# Patient Record
Sex: FEMALE | Race: WHITE
[De-identification: names, ages, dates, MRNs, and addresses within clinical notes are randomized per-mention and may not be internally consistent; named-entity substitution may affect disease eponyms.]

---

## 2019-07-18 ENCOUNTER — HOSPITAL ENCOUNTER (OUTPATIENT)
Dept: HOSPITAL 74 - JASU-ENDO | Age: 74
Discharge: HOME | End: 2019-07-18
Payer: COMMERCIAL

## 2019-07-18 VITALS — HEART RATE: 77 BPM | TEMPERATURE: 98.2 F | SYSTOLIC BLOOD PRESSURE: 142 MMHG | DIASTOLIC BLOOD PRESSURE: 63 MMHG

## 2019-07-18 DIAGNOSIS — D50.9: Primary | ICD-10-CM

## 2019-07-18 PROCEDURE — 3E033GC INTRODUCTION OF OTHER THERAPEUTIC SUBSTANCE INTO PERIPHERAL VEIN, PERCUTANEOUS APPROACH: ICD-10-PCS

## 2019-08-12 ENCOUNTER — HOSPITAL ENCOUNTER (OUTPATIENT)
Dept: HOSPITAL 74 - JINFUSION | Age: 74
Discharge: HOME | End: 2019-08-12
Payer: COMMERCIAL

## 2019-08-12 VITALS — SYSTOLIC BLOOD PRESSURE: 121 MMHG | HEART RATE: 76 BPM | DIASTOLIC BLOOD PRESSURE: 64 MMHG

## 2019-08-12 VITALS — TEMPERATURE: 98.9 F

## 2019-08-12 DIAGNOSIS — D50.9: Primary | ICD-10-CM

## 2019-08-12 PROCEDURE — 3E033GC INTRODUCTION OF OTHER THERAPEUTIC SUBSTANCE INTO PERIPHERAL VEIN, PERCUTANEOUS APPROACH: ICD-10-PCS

## 2020-01-24 ENCOUNTER — HOSPITAL ENCOUNTER (INPATIENT)
Dept: HOSPITAL 74 - JER | Age: 75
LOS: 4 days | Discharge: HOME | DRG: 871 | End: 2020-01-28
Payer: COMMERCIAL

## 2020-01-24 VITALS — BODY MASS INDEX: 58.9 KG/M2

## 2020-01-24 DIAGNOSIS — E11.42: ICD-10-CM

## 2020-01-24 DIAGNOSIS — E03.9: ICD-10-CM

## 2020-01-24 DIAGNOSIS — R09.02: ICD-10-CM

## 2020-01-24 DIAGNOSIS — Z87.11: ICD-10-CM

## 2020-01-24 DIAGNOSIS — M48.00: ICD-10-CM

## 2020-01-24 DIAGNOSIS — D50.9: ICD-10-CM

## 2020-01-24 DIAGNOSIS — E87.2: ICD-10-CM

## 2020-01-24 DIAGNOSIS — Z79.899: ICD-10-CM

## 2020-01-24 DIAGNOSIS — M21.962: ICD-10-CM

## 2020-01-24 DIAGNOSIS — K21.9: ICD-10-CM

## 2020-01-24 DIAGNOSIS — J18.9: ICD-10-CM

## 2020-01-24 DIAGNOSIS — L40.50: ICD-10-CM

## 2020-01-24 DIAGNOSIS — N17.9: ICD-10-CM

## 2020-01-24 DIAGNOSIS — A41.89: Primary | ICD-10-CM

## 2020-01-24 DIAGNOSIS — E83.42: ICD-10-CM

## 2020-01-24 DIAGNOSIS — I71.4: ICD-10-CM

## 2020-01-24 DIAGNOSIS — J44.9: ICD-10-CM

## 2020-01-24 DIAGNOSIS — E78.5: ICD-10-CM

## 2020-01-24 DIAGNOSIS — R26.81: ICD-10-CM

## 2020-01-24 DIAGNOSIS — E87.6: ICD-10-CM

## 2020-01-24 DIAGNOSIS — I10: ICD-10-CM

## 2020-01-24 LAB
ALBUMIN SERPL-MCNC: 4 G/DL (ref 3.4–5)
ALP SERPL-CCNC: 51 U/L (ref 45–117)
ALT SERPL-CCNC: 32 U/L (ref 13–61)
ANION GAP SERPL CALC-SCNC: 13 MMOL/L (ref 8–16)
APTT BLD: 29.7 SECONDS (ref 25.2–36.5)
AST SERPL-CCNC: 33 U/L (ref 15–37)
BASOPHILS # BLD: 0.4 % (ref 0–2)
BILIRUB SERPL-MCNC: 0.4 MG/DL (ref 0.2–1)
BUN SERPL-MCNC: 16.2 MG/DL (ref 7–18)
CALCIUM SERPL-MCNC: 9.5 MG/DL (ref 8.5–10.1)
CHLORIDE SERPL-SCNC: 96 MMOL/L (ref 98–107)
CO2 SERPL-SCNC: 27 MMOL/L (ref 21–32)
CREAT SERPL-MCNC: 1.4 MG/DL (ref 0.55–1.3)
DEPRECATED RDW RBC AUTO: 20.4 % (ref 11.6–15.6)
EOSINOPHIL # BLD: 0.2 % (ref 0–4.5)
GLUCOSE SERPL-MCNC: 120 MG/DL (ref 74–106)
HCT VFR BLD CALC: 38 % (ref 32.4–45.2)
HGB BLD-MCNC: 12.3 GM/DL (ref 10.7–15.3)
INR BLD: 1.07 (ref 0.83–1.09)
LYMPHOCYTES # BLD: 4.6 % (ref 8–40)
MCH RBC QN AUTO: 27.9 PG (ref 25.7–33.7)
MCHC RBC AUTO-ENTMCNC: 32.3 G/DL (ref 32–36)
MCV RBC: 86.3 FL (ref 80–96)
MONOCYTES # BLD AUTO: 11.8 % (ref 3.8–10.2)
NEUTROPHILS # BLD: 83 % (ref 42.8–82.8)
PLATELET # BLD AUTO: 222 K/MM3 (ref 134–434)
PLATELET BLD QL SMEAR: ADEQUATE
PMV BLD: 9.6 FL (ref 7.5–11.1)
POTASSIUM SERPLBLD-SCNC: 3.5 MMOL/L (ref 3.5–5.1)
PROT SERPL-MCNC: 7.8 G/DL (ref 6.4–8.2)
PT PNL PPP: 12.6 SEC (ref 9.7–13)
RBC # BLD AUTO: 4.4 M/MM3 (ref 3.6–5.2)
SODIUM SERPL-SCNC: 135 MMOL/L (ref 136–145)
WBC # BLD AUTO: 9.2 K/MM3 (ref 4–10)

## 2020-01-24 RX ADMIN — HYDRALAZINE HYDROCHLORIDE SCH MG: 25 TABLET, FILM COATED ORAL at 21:48

## 2020-01-24 RX ADMIN — DOCUSATE SODIUM SCH MG: 100 CAPSULE, LIQUID FILLED ORAL at 21:49

## 2020-01-24 RX ADMIN — INSULIN ASPART SCH: 100 INJECTION, SOLUTION INTRAVENOUS; SUBCUTANEOUS at 21:56

## 2020-01-24 RX ADMIN — ATORVASTATIN CALCIUM SCH MG: 20 TABLET, FILM COATED ORAL at 21:49

## 2020-01-24 NOTE — HP
Admitting History and Physical





- Admission


Chief Complaint: 74 y.O F presented today with fever, generalized weakness, 

nausea, vomiting, difficulty tolerationg PO food/ fluids, cough, SOB, hypoxemia 

on on  RA and was sent to ER [facility]


History of Present Illness: 


DM type 2 on Jentadueto


HLD


Hypothyroidism


Iron deficiency Anemia, GI bleed, 


HTN on Cardizem, Hydralazine, Losartan HCT


Ascending AA repair at [facility]


Psoriatic arthritis on Methotrexate


Pancreatitis


Spinal stenosis, s/p ostheoporotic vertebral fracture- on Prolia.


Asthma/COPD


Ankle surgeries.


Umbilical hernia repair.





History Source: Patient, Medical Record


Limitations to Obtaining History: No Limitations





- Past Medical History


CNS: Yes: Peripheral Neuropathy, Vertigo.  No: Alzheimer's, CVA, Dementia, 

Multiple Sclerosis, Parkinson's, Seizure


Cardiovascular: Yes: Aneurysm, HTN, Hyperlipdemia, Murmur.  No: CHF


Pulmonary: Yes: Asthma.  No: O2 Dependent, Sleep Apnea


Gastrointestinal: Yes: GI Bleed, Pancreatitis, Peptic Ulcer Disease.  No: 

Ascites, Constipation, Crohn's Disease, Irritable Bowel Disease, Ulcerative 

Colitis


Heme/Onc: Yes: Anemia


Rheumatology: Yes: Other (Psoriatic arthritis)


Endocrine: Yes: Diabetes Mellitus, Hypothyroidism





- Past Surgical History


Past Surgical History: Yes: AAA Repair





- Smoking History


Smoking history: Never smoked


Aproximately how many cigarettes per day: 0





- Alcohol/Substance Use


Hx Alcohol Use: No





- Social History


Usual Living Arrangement: Yes: With Spouse





Home Medications





- Allergies


Allergies/Adverse Reactions: 


 Allergies











Allergy/AdvReac Type Severity Reaction Status Date / Time


 


oxycodone Allergy   Verified 01/24/20 12:11


 


tuna oil Allergy   Verified 01/24/20 12:11














- Home Medications


Home Medications: 


Ambulatory Orders





Aspirin Coated [Ecotrin -] 81 mg PO DAILY 05/29/12 


Atorvastatin Ca [Lipitor] 20 mg PO DAILY 05/29/12 


Diltiazem [Cardizem -] 420 mg PO DAILY 05/29/12 


Glipizide/Metformin HCl [Metaglip 2.5-250 mg Tablet] 2 tab PO DAILY 05/29/12 


Levothyroxine Sodium [Synthroid] 25 mcg PO DAILY 05/29/12 


Losartan 50Mg/Hctz 12.5MG [Hyzaar -] 50 mg PO DAILY 05/29/12 


Esomeprazole Mag Trihydrate [Nexium] 40 mg PO DAILY 02/10/14 


Folic Acid 1 mg PO DAILY 07/18/19 


Hydralazine HCl 25 mg PO DAILY 07/18/19 











Family Medical History


Family History: Unremarkable





Review of Systems





- Review of Systems


Constitutional: reports: Lethargy, Loss of Appetite, Malaise, Weakness


Eyes: reports: No Symptoms


HENT: reports: Throat Pain


Neck: denies: Decreased ROM, Tenderness


Cardiovascular: reports: Shortness of Breath.  denies: Chest Pain, Edema


Respiratory: reports: Cough, SOB, SOB on Exertion.  denies: Hemoptysis, Wheezing


Gastrointestinal: reports: Nausea, Vomiting.  denies: Abdominal Pain, Bloating


Genitourinary: denies: Burning, Discharge, Dysuria


Breasts: reports: No Symptoms Reported


Musculoskeletal: reports: Back Pain, Decreased ROM, Muscle Pain


Integumentary: reports: No Symptoms


Neurological: denies: Change in LOC, Change in Speech, Confusion, Seizure, 

Unsteady Gait, Weakness


Endocrine: reports: Excessive Sweating, Intolerance to Cold, Intolerance to Heat


Hematology/Lymphatic: denies: Easily Bruised, Excessive Bleeding, Swollen Glands


Psychiatric: reports: No Symptoms





Physical Examination


Vital Signs: 


 Vital Signs











Temperature  100.0 F H  01/24/20 15:29


 


Pulse Rate  71   01/24/20 15:29


 


Respiratory Rate  18   01/24/20 15:29


 


Blood Pressure  111/60   01/24/20 15:29


 


O2 Sat by Pulse Oximetry (%)  97   01/24/20 15:29











Constitutional: Yes: Anxious, Ashen, Diaphoresis, Moderate Distress


Eyes: Yes: Conjunctiva Clear, EOM Intact


HENT: Yes: Atraumatic, Normocephalic, Pharyngeal Erythema


Neck: Yes: Supple, Trachea Midline.  No: Lymphadenopathy, Rigid


Cardiovascular: Yes: Regular Rate and Rhythm, S1, S2.  No: JVD


Respiratory: Yes: Regular, CTA Bilaterally, On Nasal O2.  No: Accessory Muscle 

Use


Gastrointestinal: Yes: Normal Bowel Sounds, Soft, Hepatomegaly.  No: Abdomen, 

Obese, Ascites, Hypoactive Bowel Sounds, Splenomegaly, Tenderness, Tenderness, 

Epigastrium


...Rectal Exam: Yes: Deferred


Renal/: No: Anuria, Bladder Distention, CVA Tenderness - Left, CVA Tenderness 

- Right


Breast(s): Yes: WNL


Musculoskeletal: Yes: Back Pain


Extremities: Yes: Deformity.  No: Amputation, Calf Tenderness, Cold


Peripheral Pulses WNL: No


Integumentary: Yes: WNL


Neurological: Yes: Alert, Oriented, Tingling, Unsteady Gait, Weakness.  No: 

Aphasia, Facial Droop, Lethargy, Seizure, Unresponsive


...Motor Strength: WNL


Psychiatric: Yes: WNL


Labs: 


 CBC, BMP





 01/24/20 13:07 





 01/24/20 13:07 











Imaging





- Results


Chest X-ray: Report Reviewed





Problem List





- Problems


(1) Hypoxia


Assessment/Plan: 


O2 supplementation NC


Pulm consult


CT chest


Code(s): R09.02 - HYPOXEMIA   





(2) Pneumonia


Assessment/Plan: 


CT chest


ID consult


Iv ceftriaxone.


Code(s): J18.9 - PNEUMONIA, UNSPECIFIED ORGANISM   


Qualifiers: 


   Pneumonia type: due to unspecified organism   Laterality: unspecified 

laterality   Lung location: unspecified part of lung   Qualified Code(s): J18.9 

- Pneumonia, unspecified organism   





(3) Sepsis


Assessment/Plan: 


Lactate 3.0


repeat :actate afte 30 ml/kg fluids.


Bld cx-P


Code(s): A41.9 - SEPSIS, UNSPECIFIED ORGANISM   


Qualifiers: 


   Sepsis type: sepsis due to unspecified organism   Sepsis acute organ 

dysfunction status: without acute organ dysfunction   Qualified Code(s): A41.9 

- Sepsis, unspecified organism   





(4) Arthritis


Assessment/Plan: 


Hold Metotrexate for now.


Code(s): M19.90 - UNSPECIFIED OSTEOARTHRITIS, UNSPECIFIED SITE   





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


Assessment/Plan: 


BGM


Sliding scale


Levemir 10 units Qd





Code(s): E13.9 - OTHER SPECIFIED DIABETES MELLITUS WITHOUT COMPLICATIONS   





(6) HTN (hypertension)


Assessment/Plan: 


Cardizem, hydralazine, ARB PO.


Code(s): I10 - ESSENTIAL (PRIMARY) HYPERTENSION   


Qualifiers: 


   Hypertension type: essential hypertension   Qualified Code(s): I10 - 

Essential (primary) hypertension

## 2020-01-24 NOTE — CONS
DATE OF CONSULTATION:  01/24/2020

 

INFECTIOUS DISEASE CONSULTATION

 

REQUESTED BY:  Corona Dillon MD

 

This is a 74-year-old female with a history of hypertension, non-insulin-dependent

diabetes mellitus and psoriatic arthritis.  She presents to the ER today with

complaints of dizziness for the last two or three days.  She has been feeling like

she has had a upper respiratory tract infection with a stuffed nose, minimal cough. 

This morning, she started having nausea and dizziness but she denies any chills at

home.  She does not recall having any fever.  She has not vomited.  She has no

diarrhea.  There is no history of any travel.  She has not had any sick contacts. 

She denies abdominal pain or chest pain.  She lives with her , who is well. 

She saw her PCP for these complaints, who sent her to the ER.  She had fever of 101

in the ER on arrival with a heart rate of 95.  Blood pressure was 102/46.  Her O2

saturation on admission was 84% with improvement in her fever.  Her current

temperature was 100 with an O2 saturation of 97%.  She received IV hydration in the

ER with improvement.  She has now consumed a whole bottle of water and is feeling

somewhat improved.  

 

PAST MEDICAL HISTORY:  Notable for type 2 diabetes, hyperlipidemia, hypothyroidism,

anemia, GI bleed, hypertension, psoriatic arthritis, pancreatitis, asthma, COPD.

 

PAST SURGICAL HISTORY:  Status post ascending aorta repair at [facility] in October 2018.  The surgery went very well.  There were no complications.  She has had surgery

on her left foot and has had an umbilical hernia repair.  

 

CURRENT MEDICATIONS:  Ecotrin, atorvastatin, Cardizem, Metaglip, Synthroid, Hyzaar,

Nexium, folic acid and hydralazine.  She is also on weekly methotrexate and takes

Humira every two weeks.  

 

SOCIAL HISTORY:  She is originally from [place].  There is no history of cigarette or

substance use.  She lives with her spouse.  

 

FAMILY HISTORY:  Unremarkable. 

 

REVIEW OF SYSTEMS:  She denies pharyngitis.  She has no headache.  She reports

minimal cough and denies any hemoptysis.  She is not wheezing.  She notes nausea but

on vomiting.  There is no abdominal pain or diarrhea.  She has no dysuria or flank

pain.  She has no skin rash or breaks in her skin.    

 

PHYSICAL EXAMINATION:  

VITALS:  T-max is 101.8, current temperature is 100, pulse is 71, blood pressure

111/60, respiratory rate 18, oxygen saturation 97% on 2 liters.

HEENT:  Normocephalic.  Eyes are anicteric.  She has no conjunctival hemorrhages. 

Her mouth is without any pharyngitis.  

NECK:  Supple.  There are no meningeal signs.  

LUNGS:  Clear to auscultation.  

HEART:  Regular rate and rhythm.  

ABDOMEN:  Soft, nontender.  No CVA or spinal tenderness. 

EXTREMITIES:  No edema.  Her left foot has a deformity.  

SKIN:  There is no skin breakdown.  She has no rash.  

 

LABS:  White count is 9.2, hemoglobin 12.3, platelets of 222,000 with 83%

lymphocytes.  Her INR is 1.  BUN and creatinine are 16 and 1.4.  Lactic acid is 3. 

LFTs are normal.  Influenza screen is negative.  Blood cultures have been sent and

are pending.  

 

To my reading, her chest x-ray is negative. 

 

IN SUMMARY:  This is an elderly woman, immunocompromised by virtue of her psoriatic

arthritis, on methotrexate and Humira.  She presents with what appears to be a viral

syndrome with URI symptoms and now with dizziness and fever of unclear etiology.  She

has been given fluids.  She has been given ceftriaxone and Zithromax.  A CAT scan of

her chest has been ordered.  She is now resting quite comfortably.  We will give her

one dose of vancomycin as well, as the source of her sepsis is unclear.  She needs a

urinalysis and urine culture.  Would also obtain a Legionella urinary antigen and

follow up on the CAT scan. 

 

 

 

JAKOB CHARLES M.D.

 

[id number]

DD: 01/24/2020 18:22

DT: 01/24/2020 19:55

Job #:  [id number]

## 2020-01-24 NOTE — EKG
Test Reason : 

Blood Pressure : ***/*** mmHG

Vent. Rate : 078 BPM     Atrial Rate : 078 BPM

   P-R Int : 168 ms          QRS Dur : 076 ms

    QT Int : 392 ms       P-R-T Axes : 034 -26 092 degrees

   QTc Int : 446 ms

 

*** POOR DATA QUALITY, INTERPRETATION MAY BE ADVERSELY AFFECTED

NORMAL SINUS RHYTHM

POSSIBLE LEFT ATRIAL ENLARGEMENT

NONSPECIFIC ST ABNORMALITY

ABNORMAL ECG

 

Confirmed by RIGO ORTIZ MD ([id number]) on 1/24/2020 1:59:45 PM

 

Referred By:             Confirmed By:RIGO ORTIZ MD

## 2020-01-24 NOTE — PN
Progress Note (short form)





- Note


Progress Note: 





ID consult dictated





75 yo female admitted from home with dizziness and nausea this am


she has had uri symptoms with runny nose and mild cough for last 2-3 days


no diarrhea, no dysuria


no chills


takes humira and methotrexate for her psoriatic arthritis


history of aneurysm repair October 2018





found to be febrile in ED, also hypoxia


lactic acid 3- no better after ivf





going for chest ct


got rocephin/ziithromax in ED





sepsis


r/o pneumonia


immunocompromised host by meds





blood cultures


urinary antigens


chest ct


UA and urine culture





vancomycin 


rocephin/zithromax to continue











Problem List





- Problems


(1) Sepsis


Code(s): A41.9 - SEPSIS, UNSPECIFIED ORGANISM   


Qualifiers: 


   Sepsis type: sepsis due to unspecified organism   Sepsis acute organ 

dysfunction status: without acute organ dysfunction   Qualified Code(s): A41.9 

- Sepsis, unspecified organism   





(2) Pneumonia


Code(s): J18.9 - PNEUMONIA, UNSPECIFIED ORGANISM   


Qualifiers: 


   Pneumonia type: due to unspecified organism   Laterality: unspecified 

laterality   Lung location: unspecified part of lung   Qualified Code(s): J18.9 

- Pneumonia, unspecified organism   





(3) Immunocompromised state due to drug therapy


Code(s): Z79.899 - OTHER LONG TERM (CURRENT) DRUG THERAPY

## 2020-01-24 NOTE — PDOC
Documentation entered by Bekah Sims SCRIBE, acting as scribe for Connor Vernon MD.








Connor Vernon MD:  This documentation has been prepared by the Levi pickard Adrianna, SCRIBE, under my direction and personally reviewed by me in its entirety.  I 

confirm that the documentation accurately reflects all work, treatment, 

procedures, and medical decision making performed by me.  





Attending Attestation





- Resident


Resident Name: Debi Arechiga





- ED Attending Attestation


I have performed the following: I have examined & evaluated the patient, The 

case was reviewed & discussed with the resident, I agree w/resident's findings 

& plan, Exceptions are as noted





- HPI


HPI: 


The patient is a 74 year old female, with a significant PMH of hypertension, 

hyperlipidemia, non-insulin-dependent diabetes, hypothyroid thyroidism, GERD, 

arthritis and left foot deformity, who presents to the ED for evaluation of 

cough for 3 days, and nausea and unsteady gait today. Patient endorses a dry 

cough for the past 3 days. Today, she developed nausea, and has been avoiding 

eating or drinking anything in fear of vomiting. She additionally reports 

feeling dizzy and unsteady when ambulating. Patient saw her PCP for these 

complaints, who advised her to come to the ED for further evaluation. 





Allergies: Oxycodone, tuna oil


Surgical History:Left foot and bilateral knee orthopedic surgery 


Social History: Denies EtOH, tobacco, or illicit drug use


PCP: Dr. Dillon 





- Physicial Exam


PE: 


GENERAL: The patient is awake, alert, and fully oriented, Nontoxic - in no 

acute distress.


HEAD: Normocephalic, atraumatic.


EYES: extraocular movements intact, sclera anicteric, conjunctiva clear.


ENT: Normal voice, Moist mucous membranes.


NECK: Normal range of motion, supple


LUNGS: Breath sounds equal, clear to auscultation bilaterally. No wheezes, no 

rhonchi, no rales.


HEART: Regular rate and rhythm, without murmur, rub or gallop.


ABDOMEN: Soft, nontender, No guarding, no rebound.No CVA tenderness


EXTREMITIES: Normal range of motion, no edema. No cyanosis. No erythema, or 

tenderness.


NEUROLOGICAL: No facial asymmetry, Normal speech,


PSYCH: Normal mood, normal affect.


SKIN: Warm, Dry, normal turgor.








- Critical Care Time


Total Critical Care Time: 60


Critical Care Statement: The care of this patient involved high complexity 

decision making to prevent further life threatening deterioration of the patient

's condition and/or to evaluate & treat vital organ system(s) failure or risk 

of failure.





- Medical Decision Making





01/24/20 14:13


74 F with cough and nausea. Found to be febrile and hypoxic. Will evaluate for 

sepsis. Possible PNA.


- Labs, cultures


- CXR, UA


- IVF, tylenol, abx

## 2020-01-24 NOTE — PDOC
History of Present Illness





- General


Chief Complaint: SIRS, Suspected/Possible


Stated Complaint: VOMITING/ WEAKNESS


Time Seen by Provider: 01/24/20 12:27





- History of Present Illness


Initial Comments: 


Radha Cast is a 75yo woman with a PMH of HTN, HLD, NIDDM, hypythyroidism, 

GERD, OA who presents with 3 days of cough, now with nausea and unsteadiness 

while walking today. She states that she has had the dry cough for the past 

several days but has otherwise been feeling in her normal state of health. Today

, she felt extremely nauseated and did not eat or drink anything today, fearing 

that she would start vomiting if she did. She went to her PMD this morning and 

reports she was told that she "probably has a virus" but was sent to the ED for 

additional workup. 





Ms Cast additionally reports that she has felt unsteady walking and 

slightly dizzy today. She denies any sensation of vertigo, and she does not 

believe that the dizziness worsens with change in position. She endorses a long 

history of rt ear tinnitus but denies any recent changes in this condition. She 

additionally denies any recent congestion, rhinorrhea, sore throat, fevers/

chills or other recent symptoms. She does report that she feels dehydrated 

today. 











Past History





- Past Medical History


Allergies/Adverse Reactions: 


 Allergies











Allergy/AdvReac Type Severity Reaction Status Date / Time


 


oxycodone Allergy   Verified 01/24/20 12:11


 


tuna oil Allergy   Verified 01/24/20 12:11











Home Medications: 


Ambulatory Orders





Aspirin Coated [Ecotrin -] 81 mg PO DAILY 05/29/12 


Atorvastatin Ca [Lipitor] 20 mg PO DAILY 05/29/12 


Diltiazem [Cardizem -] 420 mg PO DAILY 05/29/12 


Glipizide/Metformin HCl [Metaglip 2.5-250 mg Tablet] 2 tab PO DAILY 05/29/12 


Levothyroxine Sodium [Synthroid] 25 mcg PO DAILY 05/29/12 


Losartan 50Mg/Hctz 12.5MG [Hyzaar -] 50 mg PO DAILY 05/29/12 


Esomeprazole Mag Trihydrate [Nexium] 40 mg PO DAILY 02/10/14 


Folic Acid 1 mg PO DAILY 07/18/19 


Hydralazine HCl 25 mg PO DAILY 07/18/19 








COPD: No


Diabetes: Yes


HTN: Yes


Hypercholesterolemia: Yes


Thyroid Disease: Yes





- Surgical History


Orthopedic Surgery: Yes (L FOOT/BILATERAL KNEE)





- Psycho Social/Smoking Cessation Hx


Smoking Status: No


Smoking History: Never smoked


Number of Cigarettes Smoked Daily: 0


Hx Alcohol Use: No


Drug/Substance Use Hx: No


Substance Use Type: None





**Review of Systems





- Review of Systems


Comments:: 


General: + fevers, no chills, no weight or appetite change, + malaise


HEENT: No changes in vision, no changes in hearing, no congestion, no sore 

throat


CV: No chest pain, no palpitations, no LE edema


Pulm: No SOB, + cough, no wheezing


GI: + nausea, no vomiting, no change in bowel habits, no melena


: No frequency, no urgency, no dysuria


Musc: No back pain, no joint swelling, no recent injury


Skin: No rash, no lesions, no erythema


Endo: No excessive thirst, no heat/cold intolerance


Heme: No unusual bruising or bleeding, no swollen glands


Neuro: No syncope, no numbness/tingling, no focal weakness


Vasc: No claudication


Psych: No recent change in mood, no SI or HI











*Physical Exam





- Vital Signs


 Last Vital Signs











Temp Pulse Resp BP Pulse Ox


 


 101.3 F H  95 H  20   102/46 L  84 L


 


 01/24/20 12:14  01/24/20 12:14  01/24/20 12:14  01/24/20 12:14  01/24/20 12:14














- Physical Exam


General: Comfortable, no acute distress


HEENT: Atraumatic, PERRL, EOMI, MMM, voice normal, normal neck ROM 


Cards: RRR, no murmur appreciated


Pulm: Comfortable on room air, clear to auscultation bilaterally


Abd: Soft, nontender, nondistended


Ext: Atraumatic. No LE edema. ROM intact. WWP


Skin: Normal color, no rashes or lesions


Neuro: A&Ox3, CN grossly intact, normal speech, motor/sensory grossly intact 

and symmetric


Psych: Mood appropriate to situation 











ED Treatment Course





- LABORATORY


CBC & Chemistry Diagram: 


 01/24/20 13:07





 01/24/20 13:07





Medical Decision Making





- Medical Decision Making





01/24/20 12:55


Radha Cast is a 75yo woman with a PMH of HTN, HLD, NIDDM, hypythyroidism, 

GERD, OA who presents with 3 days of cough, now with nausea and unsteadiness 

while walking today. She has not had any food or fluids today due to the 

nausea. She was seen earlier today by her PMD and sent to the ED for additional 

evaluation. 





- Hypoxic and febrile on presentation (sats now 89% on RA), dry mouth/lips also 

noted. Concerning for sepsis, dehydration. Pneumonia v influenza most likely, 

though could be viral flu-like illness


- Sepsis workup


- Influenza


- IVF, acetaminophen





01/24/20 13:52


- Bedside US completed by Dr Briones. B-lines on Rt lung US noted


- Labs and CXR pending





01/24/20 14:13


- EKG reviewed. NSR, HR 78, normal axis, normal intervals, nonspecific ST 

abnormality


- No leukocytosis. Remainder of labs pending


- CXR to be completed.





01/24/20 16:48


- CXR without infiltrate, but pt appears to have pneumonia clinically. Will 

admit for additional management given hypoxia. Now at 94% w/ 2L by NC


- Dr Dillon at bedside to admit Ms Cast








Discussed with Dr Janeen Arechiga


PGY2








Discharge





- Discharge Information


Problems reviewed: Yes


Clinical Impression/Diagnosis: 


 Hypoxia





Sepsis


Qualifiers:


 Sepsis type: sepsis due to unspecified organism Sepsis acute organ dysfunction 

status: without acute organ dysfunction Qualified Code(s): A41.9 - Sepsis, 

unspecified organism





Pneumonia


Qualifiers:


 Pneumonia type: due to unspecified organism Laterality: unspecified laterality 

Lung location: unspecified part of lung Qualified Code(s): J18.9 - Pneumonia, 

unspecified organism








- Admission


Yes





- Follow up/Referral





- Patient Discharge Instructions





- Post Discharge Activity

## 2020-01-25 LAB
ALBUMIN SERPL-MCNC: 3.6 G/DL (ref 3.4–5)
ALP SERPL-CCNC: 46 U/L (ref 45–117)
ALT SERPL-CCNC: 33 U/L (ref 13–61)
ANION GAP SERPL CALC-SCNC: 9 MMOL/L (ref 8–16)
ANISOCYTOSIS BLD QL: (no result)
AST SERPL-CCNC: 34 U/L (ref 15–37)
BASOPHILS # BLD: 0.3 % (ref 0–2)
BILIRUB SERPL-MCNC: 0.4 MG/DL (ref 0.2–1)
BUN SERPL-MCNC: 11 MG/DL (ref 7–18)
CALCIUM SERPL-MCNC: 8.7 MG/DL (ref 8.5–10.1)
CHLORIDE SERPL-SCNC: 98 MMOL/L (ref 98–107)
CO2 SERPL-SCNC: 31 MMOL/L (ref 21–32)
CREAT SERPL-MCNC: 0.9 MG/DL (ref 0.55–1.3)
DEPRECATED RDW RBC AUTO: 19.6 % (ref 11.6–15.6)
EOSINOPHIL # BLD: 0.3 % (ref 0–4.5)
GLUCOSE SERPL-MCNC: 183 MG/DL (ref 74–106)
HCT VFR BLD CALC: 36.6 % (ref 32.4–45.2)
HGB BLD-MCNC: 11.9 GM/DL (ref 10.7–15.3)
LYMPHOCYTES # BLD: 14.3 % (ref 8–40)
MACROCYTES BLD QL: (no result)
MAGNESIUM SERPL-MCNC: 1.7 MG/DL (ref 1.8–2.4)
MCH RBC QN AUTO: 27.7 PG (ref 25.7–33.7)
MCHC RBC AUTO-ENTMCNC: 32.4 G/DL (ref 32–36)
MCV RBC: 85.4 FL (ref 80–96)
MONOCYTES # BLD AUTO: 17 % (ref 3.8–10.2)
NEUTROPHILS # BLD: 68.1 % (ref 42.8–82.8)
OVALOCYTES BLD QL SMEAR: (no result)
PHOSPHATE SERPL-MCNC: 2.6 MG/DL (ref 2.5–4.9)
PLATELET # BLD AUTO: 199 K/MM3 (ref 134–434)
PLATELET BLD QL SMEAR: NORMAL
PMV BLD: 8.8 FL (ref 7.5–11.1)
POTASSIUM SERPLBLD-SCNC: 2.9 MMOL/L (ref 3.5–5.1)
PROT SERPL-MCNC: 6.8 G/DL (ref 6.4–8.2)
RBC # BLD AUTO: 4.29 M/MM3 (ref 3.6–5.2)
SODIUM SERPL-SCNC: 137 MMOL/L (ref 136–145)
WBC # BLD AUTO: 6.3 K/MM3 (ref 4–10)

## 2020-01-25 RX ADMIN — POTASSIUM CHLORIDE SCH MLS/HR: 7.46 INJECTION, SOLUTION INTRAVENOUS at 18:48

## 2020-01-25 RX ADMIN — DOCUSATE SODIUM SCH MG: 100 CAPSULE, LIQUID FILLED ORAL at 21:10

## 2020-01-25 RX ADMIN — SODIUM CHLORIDE, POTASSIUM CHLORIDE, SODIUM LACTATE AND CALCIUM CHLORIDE SCH: 600; 310; 30; 20 INJECTION, SOLUTION INTRAVENOUS at 17:45

## 2020-01-25 RX ADMIN — IPRATROPIUM BROMIDE AND ALBUTEROL SULFATE SCH: .5; 3 SOLUTION RESPIRATORY (INHALATION) at 20:00

## 2020-01-25 RX ADMIN — LOSARTAN POTASSIUM SCH MG: 50 TABLET, FILM COATED ORAL at 11:06

## 2020-01-25 RX ADMIN — AZITHROMYCIN DIHYDRATE SCH MLS/HR: 500 INJECTION, POWDER, LYOPHILIZED, FOR SOLUTION INTRAVENOUS at 16:04

## 2020-01-25 RX ADMIN — SODIUM CHLORIDE, POTASSIUM CHLORIDE, SODIUM LACTATE AND CALCIUM CHLORIDE SCH MLS/HR: 600; 310; 30; 20 INJECTION, SOLUTION INTRAVENOUS at 11:23

## 2020-01-25 RX ADMIN — CEFTRIAXONE SCH MLS/HR: 1 INJECTION, POWDER, FOR SOLUTION INTRAMUSCULAR; INTRAVENOUS at 11:07

## 2020-01-25 RX ADMIN — INSULIN ASPART SCH: 100 INJECTION, SOLUTION INTRAVENOUS; SUBCUTANEOUS at 12:51

## 2020-01-25 RX ADMIN — HYDRALAZINE HYDROCHLORIDE SCH MG: 25 TABLET, FILM COATED ORAL at 16:04

## 2020-01-25 RX ADMIN — HYDRALAZINE HYDROCHLORIDE SCH MG: 25 TABLET, FILM COATED ORAL at 06:06

## 2020-01-25 RX ADMIN — PANTOPRAZOLE SODIUM SCH MG: 40 TABLET, DELAYED RELEASE ORAL at 11:06

## 2020-01-25 RX ADMIN — ASPIRIN SCH MG: 81 TABLET, COATED ORAL at 11:06

## 2020-01-25 RX ADMIN — HYDRALAZINE HYDROCHLORIDE SCH MG: 25 TABLET, FILM COATED ORAL at 21:11

## 2020-01-25 RX ADMIN — MAGNESIUM OXIDE TAB 400 MG (241.3 MG ELEMENTAL MG) SCH MG: 400 (241.3 MG) TAB at 11:06

## 2020-01-25 RX ADMIN — POTASSIUM CHLORIDE SCH MLS/HR: 7.46 INJECTION, SOLUTION INTRAVENOUS at 12:50

## 2020-01-25 RX ADMIN — ATORVASTATIN CALCIUM SCH MG: 20 TABLET, FILM COATED ORAL at 21:11

## 2020-01-25 RX ADMIN — INSULIN ASPART SCH: 100 INJECTION, SOLUTION INTRAVENOUS; SUBCUTANEOUS at 17:35

## 2020-01-25 RX ADMIN — IPRATROPIUM BROMIDE AND ALBUTEROL SULFATE SCH AMP: .5; 3 SOLUTION RESPIRATORY (INHALATION) at 14:20

## 2020-01-25 RX ADMIN — INSULIN ASPART SCH: 100 INJECTION, SOLUTION INTRAVENOUS; SUBCUTANEOUS at 06:06

## 2020-01-25 RX ADMIN — INSULIN ASPART SCH: 100 INJECTION, SOLUTION INTRAVENOUS; SUBCUTANEOUS at 21:10

## 2020-01-25 RX ADMIN — FOLIC ACID SCH MG: 1 TABLET ORAL at 12:50

## 2020-01-25 RX ADMIN — POTASSIUM CHLORIDE SCH MLS/HR: 7.46 INJECTION, SOLUTION INTRAVENOUS at 21:11

## 2020-01-25 RX ADMIN — SODIUM CHLORIDE, POTASSIUM CHLORIDE, SODIUM LACTATE AND CALCIUM CHLORIDE SCH: 600; 310; 30; 20 INJECTION, SOLUTION INTRAVENOUS at 11:23

## 2020-01-25 NOTE — PN
Progress Note, Physician


Chief Complaint: 





sob


History of Present Illness: 





patient feeling ok today, a little better, less SOB, still coughing





- Current Medication List


Current Medications: 


Active Medications





Acetaminophen (Tylenol -)  500 mg PO Q6H PRN


   PRN Reason: FEVER


Aspirin (Ecotrin -)  81 mg PO DAILY [facility]


   Last Admin: 01/25/20 11:06 Dose:  81 mg


Atorvastatin Calcium (Lipitor -)  20 mg PO HS [facility]


   Last Admin: 01/24/20 21:49 Dose:  20 mg


Diltiazem HCl (Cardizem Cd -)  180 mg PO DAILY [facility]


Docusate Sodium (Colace -)  300 mg PO HS [facility]


   Last Admin: 01/24/20 21:49 Dose:  300 mg


Folic Acid (Folic Acid -)  1 mg PO DAILY [facility]


Hydralazine HCl (Apresoline -)  25 mg PO TID [facility]


   Last Admin: 01/25/20 06:06 Dose:  25 mg


Ceftriaxone Sodium 1 gm/ (Dextrose)  50 mls @ 200 mls/hr IVPB DAILY [facility]; 

Protocol


   Last Admin: 01/25/20 11:07 Dose:  200 mls/hr


Lactated Ringer's (Lactated Ringers Solution)  1,000 ml in 1,000 mls @ 83 mls/

hr IV ASDIR [facility]


   Last Admin: 01/25/20 11:23 Dose:  83 mls/hr


Azithromycin 500 mg/ Dextrose  250 mls @ 250 mls/hr IVPB Q24H [facility]


Potassium Chloride (Potassium Chloride 10 Meq Premix Ivpb -)  10 meq in 100 mls 

@ 100 mls/hr IVPB Q60M [facility]


   Stop: 01/25/20 14:44


Insulin Aspart (Novolog Vial Sliding Scale -)  1 vial SQ ACHS [facility]; Protocol


   Last Admin: 01/25/20 06:06 Dose:  Not Given


Levothyroxine Sodium (Synthroid -)  25 mcg PO DAILY@0700 [facility]


Losartan Potassium (Cozaar -)  50 mg PO DAILY [facility]


   Last Admin: 01/25/20 11:06 Dose:  50 mg


Magnesium Oxide (Mag-Ox -)  400 mg PO DAILY [facility]


   Last Admin: 01/25/20 11:06 Dose:  400 mg


Magnesium Sulfate (Magnesium Sulfate)  1 gm IVPB ONCE ONE


   Stop: 01/25/20 11:37


Metformin HCl (Glucophage Xr -)  750 mg PO DAILY@0700 [facility]


Pantoprazole Sodium (Protonix -)  40 mg PO DAILY [facility]


   Last Admin: 01/25/20 11:06 Dose:  40 mg


Potassium Chloride (K-Dur -)  40 meq PO ONCE ONE


   Stop: 01/25/20 11:37


Potassium Chloride (K-Dur -)  20 meq PO DAILY [facility]


Tiotropium Bromide (Spiriva Respimat)  2 puff IH DAILY [facility]


   Last Admin: 01/25/20 11:08 Dose:  2 puff











- Objective


Vital Signs: 


 Vital Signs











Temperature  99.2 F   01/25/20 09:00


 


Pulse Rate  82   01/25/20 09:00


 


Respiratory Rate  18   01/25/20 09:00


 


Blood Pressure  131/58 L  01/25/20 09:00


 


O2 Sat by Pulse Oximetry (%)  96   01/24/20 21:00











Constitutional: Yes: Well Nourished, No Distress, Calm


Cardiovascular: Yes: WNL, Regular Rate and Rhythm


Respiratory: Yes: Cough, Rhonchi (at bases), Other (good air entry).  No: 

Accessory Muscle Use, Poor Air Entry


Gastrointestinal: Yes: WNL, Normal Bowel Sounds, Soft


Extremities: Yes: WNL


Edema: No


Labs: 


 CBC, BMP





 01/25/20 09:00 





 01/25/20 09:00 





 INR, PTT











INR  1.07  (0.83-1.09)   01/24/20  13:07    














Problem List





- Problems


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


Code(s): E13.9 - OTHER SPECIFIED DIABETES MELLITUS WITHOUT COMPLICATIONS   





(2) HTN (hypertension)


Code(s): I10 - ESSENTIAL (PRIMARY) HYPERTENSION   


Qualifiers: 


   Hypertension type: essential hypertension   Qualified Code(s): I10 - 

Essential (primary) hypertension   





(3) Hypoxia


Code(s): R09.02 - HYPOXEMIA   





(4) Pneumonia


Code(s): J18.9 - PNEUMONIA, UNSPECIFIED ORGANISM   


Qualifiers: 


   Pneumonia type: due to unspecified organism   Laterality: unspecified 

laterality   Lung location: unspecified part of lung   Qualified Code(s): J18.9 

- Pneumonia, unspecified organism   





(5) Sepsis


Code(s): A41.9 - SEPSIS, UNSPECIFIED ORGANISM   


Qualifiers: 


   Sepsis type: sepsis due to unspecified organism   Sepsis acute organ 

dysfunction status: without acute organ dysfunction   Qualified Code(s): A41.9 

- Sepsis, unspecified organism   





Assessment/Plan





Assessment:





Sepsis


RLL pnemonia


Psoriatic arthritis


AAA repair


DM


HTN


Hypokalemia


Hypomagnesemia





Plan:





-continue with rocephin/azithro, given vanco-immunocompromised


-check serologies, urine anitgens


-cultures pending


-replete lytes and monitor


-resume metformin, hold gliptins, ISS for coverage


-resume home BP meds


-ID eval

## 2020-01-25 NOTE — CON.PULM
Consult


Consult Specialty:: PULMONARY


Referred by:: PMD


Reason for Consultation:: SOB/COUGH





- History of Present Illness


Chief Complaint: SOB/COUGH


History of Present Illness: 





 73yo woman with a PMH of HTN, HLD, NIDDM, hypythyroidism, GERD, OA who 

presents with 3 days of cough, now with nausea and unsteadiness while walking 

today. She states that she has had the dry cough for the past several days but 

has otherwise been feeling in her normal state of health. She went to her PMD 

this morning and reports she was told that she "probably has a virus" but was 

sent to the ED for additional workup. 








- History Source


History Provided By: Patient, Medical Record


Limitations to Obtaining History: Language Barrier





- Past Medical History


CNS: Yes: Peripheral Neuropathy, Vertigo.  No: Alzheimer's, CVA, Dementia, 

Multiple Sclerosis, Parkinson's, Seizure


Cardio/Vascular: Yes: Aneurysm, HTN, Hyperlipdemia, Murmur.  No: CHF


Pulmonary: Yes: Asthma.  No: O2 Dependent, Sleep Apnea


Gastrointestinal: Yes: GI Bleed, Pancreatitis, Peptic Ulcer Disease.  No: 

Ascites, Constipation, Crohn's Disease, Irritable Bowel Disease, Ulcerative 

Colitis


Rheumatology: Yes: Other (Psoriatic arthritis)


Endocrine: Yes: Diabetes Mellitus, Hypothyroidism





- Past Surgical History


Past Surgical History: Yes: AAA Repair





- Alcohol/Substance Use


Hx Alcohol Use: No





- Smoking History


Smoking history: Never smoked


Aproximately how many cigarettes per day: 0





Home Medications





- Allergies


Allergies/Adverse Reactions: 


 Allergies











Allergy/AdvReac Type Severity Reaction Status Date / Time


 


oxycodone Allergy   Verified 01/24/20 12:11


 


tuna oil Allergy   Verified 01/24/20 12:11














- Home Medications


Home Medications: 


Ambulatory Orders





Aspirin Coated [Ecotrin -] 81 mg PO DAILY 05/29/12 


Atorvastatin Ca [Lipitor] 20 mg PO DAILY 05/29/12 


Diltiazem [Cardizem -] 420 mg PO DAILY 05/29/12 


Glipizide/Metformin HCl [Metaglip 2.5-250 mg Tablet] 2 tab PO DAILY 05/29/12 


Levothyroxine Sodium [Synthroid] 25 mcg PO DAILY 05/29/12 


Losartan 50Mg/Hctz 12.5MG [Hyzaar -] 50 mg PO DAILY 05/29/12 


Esomeprazole Mag Trihydrate [Nexium] 40 mg PO DAILY 02/10/14 


Folic Acid 1 mg PO DAILY 07/18/19 


Hydralazine HCl 25 mg PO DAILY 07/18/19 











Family Medical History


Family History: Unremarkable





Review of Systems





- Review of Systems


Respiratory: reports: Cough, Exercise Intolerance, SOB on Exertion.  denies: 

Hemoptysis, Wheezing


Gastrointestinal: reports: Abdominal Pain, Nausea, Vomiting





Physical Exam


Vital Sings: 


 Vital Signs











Temperature  99.2 F   01/25/20 09:00


 


Pulse Rate  82   01/25/20 09:00


 


Respiratory Rate  18   01/25/20 09:00


 


Blood Pressure  131/58 L  01/25/20 09:00


 


O2 Sat by Pulse Oximetry (%)  96   01/24/20 21:00











Constitutional: Yes: Calm


Eyes: Yes: EOM Intact


HENT: Yes: Normocephalic


Neck: Yes: Trachea Midline


Cardiovascular: Yes: Regular Rate and Rhythm, S1, S2


Respiratory: Yes: Diminished


Gastrointestinal: Yes: Normal Bowel Sounds


Edema: No


Labs: 


 CBC, BMP





 01/25/20 09:00 





 01/25/20 09:00 











Imaging





- Results


Chest X-ray: Report Reviewed, Image Reviewed


Cat Scan: Report Reviewed, Image Reviewed





Problem List





- Problems


(1) COPD (chronic obstructive pulmonary disease)


Code(s): J44.9 - CHRONIC OBSTRUCTIVE PULMONARY DISEASE, UNSPECIFIED   





(2) Diabetes 1.5, managed as type 1


Code(s): E13.9 - OTHER SPECIFIED DIABETES MELLITUS WITHOUT COMPLICATIONS   





(3) HTN (hypertension)


Code(s): I10 - ESSENTIAL (PRIMARY) HYPERTENSION   


Qualifiers: 


   Hypertension type: essential hypertension   Qualified Code(s): I10 - 

Essential (primary) hypertension   





(4) Hypoxia


Code(s): R09.02 - HYPOXEMIA   





(5) Pneumonia


Code(s): J18.9 - PNEUMONIA, UNSPECIFIED ORGANISM   


Qualifiers: 


   Pneumonia type: due to unspecified organism   Laterality: unspecified 

laterality   Lung location: unspecified part of lung   Qualified Code(s): J18.9 

- Pneumonia, unspecified organism   





Assessment/Plan





O2 AS NEEDED/DUONEB/CHEST PT/ANTIBIOTICS


GYLCEMIC CONTROL


RAPID FLU NEGATIVE


F/U CT CHEST AS OUTPATIENT 6 WEEKS





SYED HOWELL MD

## 2020-01-25 NOTE — PN
Progress Note (short form)





- Note


Progress Note: 





minimal cough


minimal nausea


feels better


didn't sleep due to roommate


not dizzy


received influenza vaccine





reports taking humira every two weeks, methotrexate every week and oral 

potassium daily





no diarrhea





ate breakfast


still some lowgrade fevers





 Vital Signs











 Period  Temp  Pulse  Resp  BP Sys/Gordon  Pulse Ox


 


 Last 24 Hr  99 F-101.3 F  62-96  18-20  102-144/46-62  84-97








cor-rrr


lungs clear


abd soft,nt


ext no edema





chest ct with small RLL infiltrate, possible RUL early infiltrate





 CBC, BMP





 01/25/20 09:00 





 01/25/20 09:00 





 Microbiology





01/25/20 06:22   Urine For Antigen Detection   Legionella Antigen - Preliminary


01/25/20 06:22   Urine For Antigen Detection   Streptococcus pneumoniae Antigen 

(M - Preliminary





a/p


sepsis


 pneumonia RLL


immunocompromised host by meds for psoriatic arthritis


history of aneurysm repair 





blood cultures


urinary antigens





UA and urine culture-never sent! d/w nursing staff





vancomycin given - f/u cultures 


rocephin/zithromax to continue

## 2020-01-26 LAB
ANION GAP SERPL CALC-SCNC: 9 MMOL/L (ref 8–16)
ANISOCYTOSIS BLD QL: (no result)
BASOPHILS # BLD: 0.6 % (ref 0–2)
BUN SERPL-MCNC: 8.5 MG/DL (ref 7–18)
CALCIUM SERPL-MCNC: 8.7 MG/DL (ref 8.5–10.1)
CHLORIDE SERPL-SCNC: 101 MMOL/L (ref 98–107)
CO2 SERPL-SCNC: 30 MMOL/L (ref 21–32)
CREAT SERPL-MCNC: 0.8 MG/DL (ref 0.55–1.3)
DEPRECATED RDW RBC AUTO: 20.3 % (ref 11.6–15.6)
EOSINOPHIL # BLD: 1.1 % (ref 0–4.5)
GLUCOSE SERPL-MCNC: 145 MG/DL (ref 74–106)
HCT VFR BLD CALC: 36.4 % (ref 32.4–45.2)
HGB BLD-MCNC: 12.1 GM/DL (ref 10.7–15.3)
LYMPHOCYTES # BLD: 18.5 % (ref 8–40)
MACROCYTES BLD QL: (no result)
MCH RBC QN AUTO: 28.6 PG (ref 25.7–33.7)
MCHC RBC AUTO-ENTMCNC: 33.2 G/DL (ref 32–36)
MCV RBC: 86 FL (ref 80–96)
MONOCYTES # BLD AUTO: 20.8 % (ref 3.8–10.2)
NEUTROPHILS # BLD: 59 % (ref 42.8–82.8)
PLATELET # BLD AUTO: 197 K/MM3 (ref 134–434)
PLATELET BLD QL SMEAR: NORMAL
PMV BLD: 9.1 FL (ref 7.5–11.1)
POTASSIUM SERPLBLD-SCNC: 3 MMOL/L (ref 3.5–5.1)
RBC # BLD AUTO: 4.23 M/MM3 (ref 3.6–5.2)
SODIUM SERPL-SCNC: 139 MMOL/L (ref 136–145)
WBC # BLD AUTO: 5.3 K/MM3 (ref 4–10)

## 2020-01-26 RX ADMIN — SODIUM CHLORIDE, POTASSIUM CHLORIDE, SODIUM LACTATE AND CALCIUM CHLORIDE SCH MLS/HR: 600; 310; 30; 20 INJECTION, SOLUTION INTRAVENOUS at 06:21

## 2020-01-26 RX ADMIN — HYDRALAZINE HYDROCHLORIDE SCH MG: 25 TABLET, FILM COATED ORAL at 06:34

## 2020-01-26 RX ADMIN — IPRATROPIUM BROMIDE AND ALBUTEROL SULFATE SCH AMP: .5; 3 SOLUTION RESPIRATORY (INHALATION) at 08:12

## 2020-01-26 RX ADMIN — AZITHROMYCIN DIHYDRATE SCH MLS/HR: 500 INJECTION, POWDER, LYOPHILIZED, FOR SOLUTION INTRAVENOUS at 15:26

## 2020-01-26 RX ADMIN — INSULIN ASPART SCH UNITS: 100 INJECTION, SOLUTION INTRAVENOUS; SUBCUTANEOUS at 22:03

## 2020-01-26 RX ADMIN — ASPIRIN SCH MG: 81 TABLET, COATED ORAL at 11:33

## 2020-01-26 RX ADMIN — POTASSIUM CHLORIDE SCH MEQ: 1500 TABLET, EXTENDED RELEASE ORAL at 11:34

## 2020-01-26 RX ADMIN — INSULIN ASPART SCH: 100 INJECTION, SOLUTION INTRAVENOUS; SUBCUTANEOUS at 18:53

## 2020-01-26 RX ADMIN — INSULIN ASPART SCH: 100 INJECTION, SOLUTION INTRAVENOUS; SUBCUTANEOUS at 11:34

## 2020-01-26 RX ADMIN — IPRATROPIUM BROMIDE AND ALBUTEROL SULFATE SCH AMP: .5; 3 SOLUTION RESPIRATORY (INHALATION) at 20:38

## 2020-01-26 RX ADMIN — POTASSIUM CHLORIDE ONE MEQ: 1500 TABLET, EXTENDED RELEASE ORAL at 11:32

## 2020-01-26 RX ADMIN — LOSARTAN POTASSIUM SCH MG: 50 TABLET, FILM COATED ORAL at 11:33

## 2020-01-26 RX ADMIN — MAGNESIUM OXIDE TAB 400 MG (241.3 MG ELEMENTAL MG) SCH MG: 400 (241.3 MG) TAB at 11:33

## 2020-01-26 RX ADMIN — HYDRALAZINE HYDROCHLORIDE SCH MG: 25 TABLET, FILM COATED ORAL at 22:02

## 2020-01-26 RX ADMIN — ATORVASTATIN CALCIUM SCH MG: 20 TABLET, FILM COATED ORAL at 22:03

## 2020-01-26 RX ADMIN — DOCUSATE SODIUM SCH MG: 100 CAPSULE, LIQUID FILLED ORAL at 22:02

## 2020-01-26 RX ADMIN — POTASSIUM CHLORIDE ONE: 1500 TABLET, EXTENDED RELEASE ORAL at 12:22

## 2020-01-26 RX ADMIN — CEFTRIAXONE SCH MLS/HR: 1 INJECTION, POWDER, FOR SOLUTION INTRAMUSCULAR; INTRAVENOUS at 11:34

## 2020-01-26 RX ADMIN — SODIUM CHLORIDE, POTASSIUM CHLORIDE, SODIUM LACTATE AND CALCIUM CHLORIDE SCH: 600; 310; 30; 20 INJECTION, SOLUTION INTRAVENOUS at 18:53

## 2020-01-26 RX ADMIN — LEVOTHYROXINE SODIUM SCH MCG: 25 TABLET ORAL at 06:34

## 2020-01-26 RX ADMIN — FOLIC ACID SCH MG: 1 TABLET ORAL at 11:32

## 2020-01-26 RX ADMIN — PANTOPRAZOLE SODIUM SCH MG: 40 TABLET, DELAYED RELEASE ORAL at 11:32

## 2020-01-26 RX ADMIN — IPRATROPIUM BROMIDE AND ALBUTEROL SULFATE SCH AMP: .5; 3 SOLUTION RESPIRATORY (INHALATION) at 14:18

## 2020-01-26 RX ADMIN — INSULIN ASPART SCH: 100 INJECTION, SOLUTION INTRAVENOUS; SUBCUTANEOUS at 06:33

## 2020-01-26 RX ADMIN — HYDRALAZINE HYDROCHLORIDE SCH MG: 25 TABLET, FILM COATED ORAL at 15:25

## 2020-01-26 RX ADMIN — DOCUSATE SODIUM SCH: 100 CAPSULE, LIQUID FILLED ORAL at 22:07

## 2020-01-26 NOTE — PN
Progress Note (short form)





- Note


Progress Note: 


low grade temperature overnight


cough resolved, now eating











reports taking humira every two weeks, methotrexate every week and oral 

potassium daily





no diarrhea





 Vital Signs











 Period  Temp  Pulse  Resp  BP Sys/Gordon  Pulse Ox


 


 Last 24 Hr  98.8 F-99.7 F  73-82  18-18  119-156/49-88  95








cor-rrr


lungs clear


abd soft,nt


ext no edema








chest ct with small RLL infiltrate, possible RUL early infiltrate





  CBC, BMP





 01/26/20 07:00 





 01/26/20 07:00 





 Microbiology





01/24/20 13:07   Blood - Peripheral Venous   Blood Culture - Preliminary


                            NO GROWTH OBTAINED AFTER 24 HOURS, INCUBATION TO 

CONTINUE


                            FOR 4 DAYS.


01/24/20 13:07   Blood - Peripheral Venous   Blood Culture - Preliminary


                            NO GROWTH OBTAINED AFTER 24 HOURS, INCUBATION TO 

CONTINUE


                            FOR 4 DAYS.


01/25/20 06:22   Urine For Antigen Detection   Legionella Antigen - Final


01/25/20 06:22   Urine For Antigen Detection   Streptococcus pneumoniae Antigen 

(M - Final








a/p


sepsis


 pneumonia RLL


immunocompromised host by meds for psoriatic arthritis


history of aneurysm repair 





doing well would continue rocephin/zithromax


still requiring oxygen


consider d/c home tomorrow if she continues to improve

## 2020-01-26 NOTE — PN
Progress Note (short form)





- Note


Progress Note: 





PULMONARY





SITTING UP


FAMILY PRESENT


WANTS TO GO HOME








VSS/AFEBRILE


Constitutional: Yes: Calm


Eyes: Yes: EOM Intact


HENT: Yes: Normocephalic


Neck: Yes: Trachea Midline


Cardiovascular: Yes: Regular Rate and Rhythm, S1, S2


Respiratory: Yes: Diminished


Gastrointestinal: Yes: Normal Bowel Sounds


Edema: No


Labs: NOTED





CHECK PRE/POST AMB SPO2 R/A





- Problems


(1) COPD (chronic obstructive pulmonary disease)


Code(s): J44.9 - CHRONIC OBSTRUCTIVE PULMONARY DISEASE, UNSPECIFIED   





(2) Diabetes 1.5, managed as type 1


Code(s): E13.9 - OTHER SPECIFIED DIABETES MELLITUS WITHOUT COMPLICATIONS   





(3) HTN (hypertension)


Code(s): I10 - ESSENTIAL (PRIMARY) HYPERTENSION   


Qualifiers: 


   Hypertension type: essential hypertension   Qualified Code(s): I10 - 

Essential (primary) hypertension   





(4) Hypoxia


Code(s): R09.02 - HYPOXEMIA   





(5) Pneumonia


Code(s): J18.9 - PNEUMONIA, UNSPECIFIED ORGANISM   


Qualifiers: 


   Pneumonia type: due to unspecified organism   Laterality: unspecified 

laterality   Lung location: unspecified part of lung   Qualified Code(s): J18.9 

- Pneumonia, unspecified organism   





Assessment/Plan





O2 AS NEEDED/DUONEB/CHEST PT/ANTIBIOTICS


GYLCEMIC CONTROL


RAPID FLU NEGATIVE


F/U CT CHEST AS OUTPATIENT 6 WEEKS


CHECK PRE/POST SPO2 R/A


NO OBJECTION TO DISCHARGE PLANNING








SYED HOWELL MD








Problem List





- Problems


(1) COPD (chronic obstructive pulmonary disease)


Code(s): J44.9 - CHRONIC OBSTRUCTIVE PULMONARY DISEASE, UNSPECIFIED   





(2) Diabetes 1.5, managed as type 1


Code(s): E13.9 - OTHER SPECIFIED DIABETES MELLITUS WITHOUT COMPLICATIONS   





(3) HTN (hypertension)


Code(s): I10 - ESSENTIAL (PRIMARY) HYPERTENSION   


Qualifiers: 


   Hypertension type: essential hypertension   Qualified Code(s): I10 - 

Essential (primary) hypertension   





(4) Hypoxia


Code(s): R09.02 - HYPOXEMIA   





(5) Pneumonia


Code(s): J18.9 - PNEUMONIA, UNSPECIFIED ORGANISM   


Qualifiers: 


   Pneumonia type: due to unspecified organism   Laterality: unspecified 

laterality   Lung location: unspecified part of lung   Qualified Code(s): J18.9 

- Pneumonia, unspecified organism

## 2020-01-26 NOTE — PN
Progress Note, Physician


Chief Complaint: 





sob


History of Present Illness: 





patient feeling much better today, walking around halls, wants to go home





- Current Medication List


Current Medications: 


Active Medications





Acetaminophen (Tylenol -)  500 mg PO Q6H PRN


   PRN Reason: FEVER


Albuterol/Ipratropium (Duoneb -)  1 amp NEB RTID [facility]


   Last Admin: 01/26/20 14:18 Dose:  1 amp


Aspirin (Ecotrin -)  81 mg PO DAILY [facility]


   Last Admin: 01/26/20 11:33 Dose:  81 mg


Atorvastatin Calcium (Lipitor -)  20 mg PO [facility]


   Last Admin: 01/25/20 21:11 Dose:  20 mg


Diltiazem HCl (Cardizem Cd -)  180 mg PO DAILY [facility]


   Last Admin: 01/25/20 17:45 Dose:  180 mg


Docusate Sodium (Colace -)  300 mg PO [facility]


   Last Admin: 01/25/20 21:10 Dose:  300 mg


Folic Acid (Folic Acid -)  1 mg PO DAILY [facility]


   Last Admin: 01/26/20 11:32 Dose:  1 mg


Hydralazine HCl (Apresoline -)  25 mg PO TID [facility]


   Last Admin: 01/26/20 06:34 Dose:  25 mg


Ceftriaxone Sodium 1 gm/ (Dextrose)  50 mls @ 200 mls/hr IVPB DAILY [facility]; 

Protocol


   Last Admin: 01/26/20 11:34 Dose:  200 mls/hr


Lactated Ringer's (Lactated Ringers Solution)  1,000 ml in 1,000 mls @ 83 mls/

hr IV ASDIR [facility]


   Last Admin: 01/26/20 06:21 Dose:  83 mls/hr


Azithromycin (Zithromax 500mg Ivpb (Pre-Docked))  500 mg in 250 mls @ 250 mls/

hr IVPB Q24H [facility]


   Last Admin: 01/25/20 16:04 Dose:  250 mls/hr


Insulin Aspart (Novolog Vial Sliding Scale -)  1 vial SQ ACHS [facility]; Protocol


   Last Admin: 01/26/20 11:34 Dose:  Not Given


Levothyroxine Sodium (Synthroid -)  25 mcg PO DAILY@0700 [facility]


   Last Admin: 01/26/20 06:34 Dose:  25 mcg


Losartan Potassium (Cozaar -)  50 mg PO DAILY [facility]


   Last Admin: 01/26/20 11:33 Dose:  50 mg


Magnesium Oxide (Mag-Ox -)  400 mg PO DAILY [facility]


   Last Admin: 01/26/20 11:33 Dose:  400 mg


Metformin HCl (Glucophage Xr -)  750 mg PO DAILY@0700 [facility]


   Last Admin: 01/26/20 06:34 Dose:  750 mg


Pantoprazole Sodium (Protonix -)  40 mg PO DAILY [facility]


   Last Admin: 01/26/20 11:32 Dose:  40 mg


Potassium Chloride (K-Dur -)  20 meq PO DAILY [facility]


   Last Admin: 01/26/20 11:34 Dose:  20 meq











- Objective


Vital Signs: 


 Vital Signs











Temperature  98.9 F   01/26/20 10:00


 


Pulse Rate  93 H  01/26/20 13:53


 


Respiratory Rate  18   01/26/20 10:00


 


Blood Pressure  119/49 L  01/26/20 10:00


 


O2 Sat by Pulse Oximetry (%)  94 L  01/26/20 13:53











Constitutional: Yes: Well Nourished, No Distress, Calm


Cardiovascular: Yes: WNL, Regular Rate and Rhythm


Respiratory: Yes: On Nasal O2, Rhonchi (rll, mild).  No: Accessory Muscle Use, 

Cough, SOB, SOB on Exertion


Gastrointestinal: Yes: WNL, Normal Bowel Sounds, Soft


Extremities: Yes: WNL


Edema: No


Labs: 


 CBC, BMP





 01/26/20 07:00 





 01/26/20 07:00 





 INR, PTT











INR  1.07  (0.83-1.09)   01/24/20  13:07    














Problem List





- Problems


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


Code(s): E13.9 - OTHER SPECIFIED DIABETES MELLITUS WITHOUT COMPLICATIONS   





(2) HTN (hypertension)


Code(s): I10 - ESSENTIAL (PRIMARY) HYPERTENSION   


Qualifiers: 


   Hypertension type: essential hypertension   Qualified Code(s): I10 - 

Essential (primary) hypertension   





(3) Hypoxia


Code(s): R09.02 - HYPOXEMIA   





(4) Pneumonia


Code(s): J18.9 - PNEUMONIA, UNSPECIFIED ORGANISM   


Qualifiers: 


   Pneumonia type: due to unspecified organism   Laterality: unspecified 

laterality   Lung location: unspecified part of lung   Qualified Code(s): J18.9 

- Pneumonia, unspecified organism   





(5) Sepsis


Code(s): A41.9 - SEPSIS, UNSPECIFIED ORGANISM   


Qualifiers: 


   Sepsis type: sepsis due to unspecified organism   Sepsis acute organ 

dysfunction status: without acute organ dysfunction   Qualified Code(s): A41.9 

- Sepsis, unspecified organism   





Assessment/Plan





Assessment:





Sepsis


RLL pnemonia


Psoriatic arthritis


AAA repair


DM


HTN


Hypokalemia


Hypomagnesemia





Plan:





-continue with current abx, transition to PO in AM


-cultures neg


-replete lytes and monitor


-cw current regimen


-ID eval

## 2020-01-27 LAB
ANION GAP SERPL CALC-SCNC: 7 MMOL/L (ref 8–16)
ANISOCYTOSIS BLD QL: (no result)
BASOPHILS # BLD: 0.5 % (ref 0–2)
BUN SERPL-MCNC: 10 MG/DL (ref 7–18)
CALCIUM SERPL-MCNC: 8.8 MG/DL (ref 8.5–10.1)
CHLORIDE SERPL-SCNC: 103 MMOL/L (ref 98–107)
CO2 SERPL-SCNC: 28 MMOL/L (ref 21–32)
CREAT SERPL-MCNC: 0.8 MG/DL (ref 0.55–1.3)
DEPRECATED RDW RBC AUTO: 20.1 % (ref 11.6–15.6)
EOSINOPHIL # BLD: 1.4 % (ref 0–4.5)
GLUCOSE SERPL-MCNC: 123 MG/DL (ref 74–106)
HCT VFR BLD CALC: 35.4 % (ref 32.4–45.2)
HGB BLD-MCNC: 11.7 GM/DL (ref 10.7–15.3)
LYMPHOCYTES # BLD: 31.6 % (ref 8–40)
MACROCYTES BLD QL: 0
MAGNESIUM SERPL-MCNC: 1.6 MG/DL (ref 1.8–2.4)
MCH RBC QN AUTO: 27.9 PG (ref 25.7–33.7)
MCHC RBC AUTO-ENTMCNC: 33.1 G/DL (ref 32–36)
MCV RBC: 84.4 FL (ref 80–96)
MONOCYTES # BLD AUTO: 16.1 % (ref 3.8–10.2)
NEUTROPHILS # BLD: 50.4 % (ref 42.8–82.8)
OVALOCYTES BLD QL SMEAR: (no result)
PLATELET # BLD AUTO: 208 K/MM3 (ref 134–434)
PLATELET BLD QL SMEAR: NORMAL
PMV BLD: 8.6 FL (ref 7.5–11.1)
POTASSIUM SERPLBLD-SCNC: 3 MMOL/L (ref 3.5–5.1)
RBC # BLD AUTO: 4.19 M/MM3 (ref 3.6–5.2)
SODIUM SERPL-SCNC: 139 MMOL/L (ref 136–145)
WBC # BLD AUTO: 4.6 K/MM3 (ref 4–10)

## 2020-01-27 RX ADMIN — IPRATROPIUM BROMIDE AND ALBUTEROL SULFATE SCH AMP: .5; 3 SOLUTION RESPIRATORY (INHALATION) at 07:25

## 2020-01-27 RX ADMIN — INSULIN ASPART SCH: 100 INJECTION, SOLUTION INTRAVENOUS; SUBCUTANEOUS at 22:40

## 2020-01-27 RX ADMIN — INSULIN ASPART SCH: 100 INJECTION, SOLUTION INTRAVENOUS; SUBCUTANEOUS at 17:26

## 2020-01-27 RX ADMIN — HYDRALAZINE HYDROCHLORIDE SCH MG: 25 TABLET, FILM COATED ORAL at 06:48

## 2020-01-27 RX ADMIN — POTASSIUM CHLORIDE SCH MEQ: 1500 TABLET, EXTENDED RELEASE ORAL at 10:02

## 2020-01-27 RX ADMIN — AZITHROMYCIN DIHYDRATE SCH MLS/HR: 500 INJECTION, POWDER, LYOPHILIZED, FOR SOLUTION INTRAVENOUS at 15:06

## 2020-01-27 RX ADMIN — LEVOTHYROXINE SODIUM SCH MCG: 25 TABLET ORAL at 06:48

## 2020-01-27 RX ADMIN — IPRATROPIUM BROMIDE AND ALBUTEROL SULFATE SCH AMP: .5; 3 SOLUTION RESPIRATORY (INHALATION) at 14:37

## 2020-01-27 RX ADMIN — ATORVASTATIN CALCIUM SCH MG: 20 TABLET, FILM COATED ORAL at 22:40

## 2020-01-27 RX ADMIN — FOLIC ACID SCH MG: 1 TABLET ORAL at 10:02

## 2020-01-27 RX ADMIN — IPRATROPIUM BROMIDE AND ALBUTEROL SULFATE SCH AMP: .5; 3 SOLUTION RESPIRATORY (INHALATION) at 20:30

## 2020-01-27 RX ADMIN — PANTOPRAZOLE SODIUM SCH MG: 40 TABLET, DELAYED RELEASE ORAL at 10:02

## 2020-01-27 RX ADMIN — HYDRALAZINE HYDROCHLORIDE SCH MG: 25 TABLET, FILM COATED ORAL at 14:30

## 2020-01-27 RX ADMIN — INSULIN ASPART SCH: 100 INJECTION, SOLUTION INTRAVENOUS; SUBCUTANEOUS at 11:55

## 2020-01-27 RX ADMIN — MAGNESIUM OXIDE TAB 400 MG (241.3 MG ELEMENTAL MG) SCH MG: 400 (241.3 MG) TAB at 10:01

## 2020-01-27 RX ADMIN — ASPIRIN SCH MG: 81 TABLET, COATED ORAL at 10:03

## 2020-01-27 RX ADMIN — SODIUM CHLORIDE, POTASSIUM CHLORIDE, SODIUM LACTATE AND CALCIUM CHLORIDE SCH MLS/HR: 600; 310; 30; 20 INJECTION, SOLUTION INTRAVENOUS at 03:08

## 2020-01-27 RX ADMIN — LOSARTAN POTASSIUM SCH MG: 50 TABLET, FILM COATED ORAL at 10:03

## 2020-01-27 RX ADMIN — DOCUSATE SODIUM SCH: 100 CAPSULE, LIQUID FILLED ORAL at 22:40

## 2020-01-27 RX ADMIN — CEFTRIAXONE SCH MLS/HR: 1 INJECTION, POWDER, FOR SOLUTION INTRAMUSCULAR; INTRAVENOUS at 11:39

## 2020-01-27 RX ADMIN — INSULIN ASPART SCH: 100 INJECTION, SOLUTION INTRAVENOUS; SUBCUTANEOUS at 06:49

## 2020-01-27 RX ADMIN — HYDRALAZINE HYDROCHLORIDE SCH MG: 25 TABLET, FILM COATED ORAL at 22:39

## 2020-01-27 NOTE — PN
Progress Note (short form)





- Note


Progress Note: 


low grade temperature overnight


cough resolved, now eating











 Vital Signs











 Period  Temp  Pulse  Resp  BP Sys/Gordon  Pulse Ox


 


 Last 24 Hr  97.9 F-100.5 F  71-98  18-20  140-174/70-93  97








cor-rrr


lungs crackles right base


abd soft,nt


ext no edema





 CBC, BMP





 01/27/20 08:05 





 01/27/20 08:05 





 Microbiology





01/24/20 13:07   Blood - Peripheral Venous   Blood Culture - Preliminary


                            NO GROWTH OBTAINED AFTER 72 HOURS, INCUBATION TO 

CONTINUE


                            FOR 2 DAYS.


01/24/20 13:07   Blood - Peripheral Venous   Blood Culture - Preliminary


                            NO GROWTH OBTAINED AFTER 72 HOURS, INCUBATION TO 

CONTINUE


                            FOR 2 DAYS.


01/25/20 06:22   Urine For Antigen Detection   Legionella Antigen - Final


01/25/20 06:22   Urine For Antigen Detection   Streptococcus pneumoniae Antigen 

(M - Final











a/p


sepsis


 pneumonia RLL


immunocompromised host by meds for psoriatic arthritis


history of aneurysm repair 





doing well would continue rocephin/zithromax day #4


still requiring oxygen


consider d/c home tomorrow if she continues to improve on po ceftin

## 2020-01-27 NOTE — PN
Progress Note, Physician


Chief Complaint: 





T max 100.5, feels slightly better, K was repleted for 3.0 yesterday


History of Present Illness: 


DM type 2 on Jentadueto


HLD


Hypothyroidism


Iron deficiency Anemia, GI bleed, 


HTN on Cardizem, Hydralazine, Losartan HCT


Ascending AA repair at [facility]


Psoriatic arthritis on Methotrexate


Pancreatitis


Spinal stenosis, s/p ostheoporotic vertebral fracture- on Prolia.


Asthma/COPD


Ankle surgeries.


Umbilical hernia repair.








- Current Medication List


Current Medications: 


Active Medications





Acetaminophen (Tylenol -)  500 mg PO Q6H PRN


   PRN Reason: FEVER


Albuterol/Ipratropium (Duoneb -)  1 amp NEB RTID [facility]


   Last Admin: 01/27/20 07:25 Dose:  1 amp


Aspirin (Ecotrin -)  81 mg PO DAILY [facility]


   Last Admin: 01/26/20 11:33 Dose:  81 mg


Atorvastatin Calcium (Lipitor -)  20 mg PO HS [facility]


   Last Admin: 01/26/20 22:03 Dose:  20 mg


Diltiazem HCl (Cardizem Cd -)  180 mg PO DAILY [facility]


   Last Admin: 01/26/20 15:25 Dose:  180 mg


Docusate Sodium (Colace -)  300 mg PO HS [facility]


   Last Admin: 01/26/20 22:07 Dose:  Not Given


Folic Acid (Folic Acid -)  1 mg PO DAILY [facility]


   Last Admin: 01/26/20 11:32 Dose:  1 mg


Hydralazine HCl (Apresoline -)  25 mg PO TID [facility]


   Last Admin: 01/27/20 06:48 Dose:  25 mg


Ceftriaxone Sodium 1 gm/ (Dextrose)  50 mls @ 200 mls/hr IVPB DAILY [facility]; 

Protocol


   Last Admin: 01/26/20 11:34 Dose:  200 mls/hr


Azithromycin (Zithromax 500mg Ivpb (Pre-Docked))  500 mg in 250 mls @ 250 mls/

hr IVPB Q24H [facility]


   Last Admin: 01/26/20 15:26 Dose:  250 mls/hr


Insulin Aspart (Novolog Vial Sliding Scale -)  1 vial SQ ACHS [facility]; Protocol


   Last Admin: 01/27/20 06:49 Dose:  Not Given


Levothyroxine Sodium (Synthroid -)  25 mcg PO DAILY@0700 [facility]


   Last Admin: 01/27/20 06:48 Dose:  25 mcg


Losartan Potassium (Cozaar -)  50 mg PO DAILY [facility]


   Last Admin: 01/26/20 11:33 Dose:  50 mg


Magnesium Oxide (Mag-Ox -)  400 mg PO DAILY [facility]


   Last Admin: 01/26/20 11:33 Dose:  400 mg


Metformin HCl (Glucophage Xr -)  750 mg PO DAILY@0700 [facility]


   Last Admin: 01/27/20 06:49 Dose:  750 mg


Pantoprazole Sodium (Protonix -)  40 mg PO DAILY [facility]


   Last Admin: 01/26/20 11:32 Dose:  40 mg


Potassium Chloride (K-Dur -)  20 meq PO DAILY [facility]


   Last Admin: 01/26/20 11:34 Dose:  20 meq











- Objective


Vital Signs: 


 Vital Signs











Temperature  99.6 F   01/27/20 06:00


 


Pulse Rate  71   01/27/20 06:00


 


Respiratory Rate  18   01/27/20 06:00


 


Blood Pressure  143/76   01/27/20 06:00


 


O2 Sat by Pulse Oximetry (%)  97   01/26/20 21:00











Constitutional: Yes: Anxious, Mild Distress


Eyes: Yes: Conjunctiva Clear, EOM Intact


HENT: Yes: Atraumatic, Normocephalic


Neck: Yes: Supple, Trachea Midline.  No: Decreased ROM, Lymphadenopathy


Cardiovascular: Yes: Regular Rate and Rhythm, S1, S2.  No: JVD


Respiratory: Yes: Cough, Rhonchi, SOB


Gastrointestinal: Yes: Normal Bowel Sounds, Soft


...Rectal Exam: Yes: Deferred


Genitourinary: No: Anuria, Bladder Distention, CVA Tenderness - Right


Musculoskeletal: Yes: WNL


Extremities: No: Amputation, Calf Tenderness, Cyanosis


Edema: No


Peripheral Pulses WNL: Yes


Integumentary: No: Bruising, Erythema


Neurological: Yes: WNL


...Motor Strength: WNL


Psychiatric: Yes: WNL


Labs: 


 CBC, BMP





 01/26/20 07:00 





 01/26/20 07:00 





 INR, PTT











INR  1.07  (0.83-1.09)   01/24/20  13:07    














Problem List





- Problems


(1) Hypoxia


Assessment/Plan: 


O2 nc PRN


Code(s): R09.02 - HYPOXEMIA   





(2) Pneumonia


Assessment/Plan: 


CT chest RLL possibly SHERRY


ID consult f/u appreciated


Iv ceftriaxone to bew converted to PO


Bld cx-negative


D/C IVF


Code(s): J18.9 - PNEUMONIA, UNSPECIFIED ORGANISM   


Qualifiers: 


   Pneumonia type: due to unspecified organism   Laterality: unspecified 

laterality   Lung location: unspecified part of lung   Qualified Code(s): J18.9 

- Pneumonia, unspecified organism   





(3) Sepsis


Assessment/Plan: 


Resolved


Code(s): A41.9 - SEPSIS, UNSPECIFIED ORGANISM   


Qualifiers: 


   Sepsis type: sepsis due to unspecified organism   Sepsis acute organ 

dysfunction status: without acute organ dysfunction   Qualified Code(s): A41.9 

- Sepsis, unspecified organism   





(4) Arthritis


Assessment/Plan: 


Hold Metotrexate for now.


Code(s): M19.90 - UNSPECIFIED OSTEOARTHRITIS, UNSPECIFIED SITE   





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


Assessment/Plan: 


BGM


Sliding scale


Levemir 10 units Qd





Code(s): E13.9 - OTHER SPECIFIED DIABETES MELLITUS WITHOUT COMPLICATIONS   





(6) HTN (hypertension)


Assessment/Plan: 


Cardizem, hydralazine, ARB PO.


Code(s): I10 - ESSENTIAL (PRIMARY) HYPERTENSION   


Qualifiers: 


   Hypertension type: essential hypertension   Qualified Code(s): I10 - 

Essential (primary) hypertension   





(7) Hypokalemia


Assessment/Plan: 


Replete K PRN


follow K


Problems reviewed: Yes   


Code(s): E87.6 - HYPOKALEMIA

## 2020-01-27 NOTE — ECHO
______________________________________________________________________________



Name: AMBROSE PRESCOTT                                 Exam:Adult Echocardiogram

MRN: [id number]         Study Date: 2020 10:40 AM

Age: 74 yrs

______________________________________________________________________________



Reason For Study: CHF

Height: 58 in        Weight: 126 lb        BSA: 1.5 m2



______________________________________________________________________________



MMode/2D Measurements & Calculations

IVSd: 1.4 cm                                            Ao root diam: 3.3 cm

LVIDd: 3.7 cm                                           LA dimension: 4.4 cm

LVIDs: 2.5 cm                                           ACS: 1.8 cm

LVPWd: 1.0 cm



_________________________________________________________

EDV(Teich): 60.0 ml                                     LVOT diam: 2.0 cm

ESV(Teich): 21.7 ml



_________________________________________________________

LAV (MOD-bp): 87.9 ml                                   TAPSE: 1.8 cm

                                                        RV S Anastacio: 9.7 cm/sec



Doppler Measurements & Calculations

MV E max anastacio: 58.8 cm/sec                                  Ao V2 max: 150.7 cm/sec

MV A max anastacio: 113.2 cm/sec                                 Ao max P.1 mmHg

MV E/A: 0.52                                               Ao V2 mean: 99.4 cm/sec

MV dec time: 0.31 sec                                      Ao mean P.6 mmHg

                                                           Ao V2 VTI: 28.3 cm



                                                           FRANCESCO(I,D): 2.6 cm2

                                                           FRANCESCO(V,D): 2.2 cm2

____________________________________________________________



LV V1 max P.0 mmHg                                     SV(LVOT): 73.1 ml

LV V1 mean P.3 mmHg

LV V1 max: 111.4 cm/sec

LV V1 mean: 67.8 cm/sec

LV V1 VTI: 24.3 cm

____________________________________________________________



TR max anastacio: 230.1 cm/sec                                   PA V2 max: 119.4 cm/sec

TR max P.1 mmHg                                       PA max P.7 mmHg

                                                           PA acc time: 0.09 sec

____________________________________________________________



Med Peak E' Anastacio: 5.3 cm/sec                                PA pr(Accel): 37.8 mmHg

Med E/e': 11.0

Lat Peak E' Anastacio: 7.2 cm/sec

Lat E/e': 8.2

____________________________________________________________



Pulm Sys Anastacio: 92.5 cm/sec

Pulm Gordon Anastacio: 44.0 cm/sec

Pulm S/D: 2.1



______________________________________________________________________________



Left Ventricle

Normal size, mild basal septal hypertrophy. Normal systolic function. Grade I diastolic dysfunction, 
(abnormal

relaxation pattern).

Right Ventricle

The right ventricle is grossly normal size. The right ventricular systolic function is normal.

Atria

The left atrium is severely dilated. Right atrial size is normal.

Mitral Valve

The mitral valve is grossly normal. There is mild mitral annular calcification.

Tricuspid Valve

The tricuspid valve is not well visualized, but is grossly normal. There is mild tricuspid regurgitat
ion.

Right ventricular systolic pressure is elevated at 30-40mmHg.

Aortic Valve

There is mild aortic sclerosis.;. Trace aortic regurgitation.

Pulmonic Valve

The pulmonic valve is not well seen, but is grossly normal.

Great Vessels

The aortic root is normal size.

Pericardium/Pleura

Trivial pericardial effusion not hemodynamically significant.

______________________________________________________________________________





Interpretation Summary

Normal LV, RV systolic function. Mild basal septal Hypertrophy..Relaxation abnormality

Severely dilated left atrium

Mild tricuspid regurgitation with borderline pulmonary hypertension

Sclerotic aortic valve with trace regurgitation

Nildly calcified mitral valve annulus.





MG\dsedab 2020 11:44 AM

## 2020-01-27 NOTE — PN
Progress Note (short form)





- Note


Progress Note: 





PULMONARY





Feels better. Wants to go home. No fevers. +nonproductive cough.





 Vital Signs











 Period  Temp  Pulse  Resp  BP Sys/Gordon  Pulse Ox


 


 Last 24 Hr  97.9 F-100.5 F  71-98  18-20  141-157/74-93  94-97








Gen:  NAD at rest


Heart: RRR


Lung: decreased breath sounds at the bases


Abd: soft, nontender


Ext: no edema





 CBC, BMP





 01/27/20 08:05 





 01/27/20 08:05 





Active Medications





Acetaminophen (Tylenol -)  500 mg PO Q6H PRN


   PRN Reason: FEVER


Albuterol/Ipratropium (Duoneb -)  1 amp NEB RTID [facility]


   Last Admin: 01/27/20 07:25 Dose:  1 amp


Aspirin (Ecotrin -)  81 mg PO DAILY [facility]


   Last Admin: 01/27/20 10:03 Dose:  81 mg


Atorvastatin Calcium (Lipitor -)  20 mg PO HS [facility]


   Last Admin: 01/26/20 22:03 Dose:  20 mg


Diltiazem HCl (Cardizem Cd -)  180 mg PO DAILY [facility]


   Last Admin: 01/27/20 10:03 Dose:  180 mg


Docusate Sodium (Colace -)  300 mg PO [facility]


   Last Admin: 01/26/20 22:07 Dose:  Not Given


Folic Acid (Folic Acid -)  1 mg PO DAILY [facility]


   Last Admin: 01/27/20 10:02 Dose:  1 mg


Hydralazine HCl (Apresoline -)  25 mg PO TID [facility]


   Last Admin: 01/27/20 06:48 Dose:  25 mg


Ceftriaxone Sodium 1 gm/ (Dextrose)  50 mls @ 200 mls/hr IVPB DAILY [facility]; 

Protocol


   Last Admin: 01/27/20 11:39 Dose:  200 mls/hr


Azithromycin (Zithromax 500mg Ivpb (Pre-Docked))  500 mg in 250 mls @ 250 mls/

hr IVPB Q24H [facility]


   Last Admin: 01/26/20 15:26 Dose:  250 mls/hr


Insulin Aspart (Novolog Vial Sliding Scale -)  1 vial SQ ACHS [facility]; Protocol


   Last Admin: 01/27/20 11:55 Dose:  Not Given


Levothyroxine Sodium (Synthroid -)  25 mcg PO DAILY@0700 [facility]


   Last Admin: 01/27/20 06:48 Dose:  25 mcg


Losartan Potassium (Cozaar -)  50 mg PO DAILY [facility]


   Last Admin: 01/27/20 10:03 Dose:  50 mg


Magnesium Oxide (Mag-Ox -)  400 mg PO DAILY [facility]


   Last Admin: 01/27/20 10:01 Dose:  400 mg


Metformin HCl (Glucophage Xr -)  750 mg PO DAILY@0700 [facility]


   Last Admin: 01/27/20 06:49 Dose:  750 mg


Pantoprazole Sodium (Protonix -)  40 mg PO DAILY [facility]


   Last Admin: 01/27/20 10:02 Dose:  40 mg


Potassium Chloride (K-Dur -)  20 meq PO DAILY [facility]


   Last Admin: 01/27/20 10:02 Dose:  20 meq


Potassium Chloride (K-Dur -)  40 meq PO ONCE ONE


   Stop: 01/27/20 22:01





A/P


Pneumonia


Sepsis


Lactic Acidosis


Acute Kidney Injury


Psoriatic Arthritis


HTN


DM


h/o AAA repair


Hypothyroidism





-  antibiotics per ID


-  inhaled bronchodilators as needed


-  O2 to keep SpO2 >90%


-  replete lytes


-  DVT prophylaxis

## 2020-01-28 VITALS — HEART RATE: 85 BPM | SYSTOLIC BLOOD PRESSURE: 132 MMHG | DIASTOLIC BLOOD PRESSURE: 72 MMHG

## 2020-01-28 VITALS — TEMPERATURE: 98.9 F

## 2020-01-28 LAB
ANION GAP SERPL CALC-SCNC: 5 MMOL/L (ref 8–16)
BUN SERPL-MCNC: 8.3 MG/DL (ref 7–18)
CALCIUM SERPL-MCNC: 9 MG/DL (ref 8.5–10.1)
CHLORIDE SERPL-SCNC: 106 MMOL/L (ref 98–107)
CO2 SERPL-SCNC: 30 MMOL/L (ref 21–32)
CREAT SERPL-MCNC: 0.8 MG/DL (ref 0.55–1.3)
GLUCOSE SERPL-MCNC: 139 MG/DL (ref 74–106)
POTASSIUM SERPLBLD-SCNC: 4.1 MMOL/L (ref 3.5–5.1)
SODIUM SERPL-SCNC: 141 MMOL/L (ref 136–145)

## 2020-01-28 RX ADMIN — LOSARTAN POTASSIUM SCH MG: 50 TABLET, FILM COATED ORAL at 10:12

## 2020-01-28 RX ADMIN — PANTOPRAZOLE SODIUM SCH MG: 40 TABLET, DELAYED RELEASE ORAL at 10:11

## 2020-01-28 RX ADMIN — LEVOTHYROXINE SODIUM SCH MCG: 25 TABLET ORAL at 06:29

## 2020-01-28 RX ADMIN — HYDRALAZINE HYDROCHLORIDE SCH MG: 25 TABLET, FILM COATED ORAL at 14:12

## 2020-01-28 RX ADMIN — IPRATROPIUM BROMIDE AND ALBUTEROL SULFATE SCH AMP: .5; 3 SOLUTION RESPIRATORY (INHALATION) at 08:30

## 2020-01-28 RX ADMIN — IPRATROPIUM BROMIDE AND ALBUTEROL SULFATE SCH AMP: .5; 3 SOLUTION RESPIRATORY (INHALATION) at 11:52

## 2020-01-28 RX ADMIN — POTASSIUM CHLORIDE SCH MEQ: 1500 TABLET, EXTENDED RELEASE ORAL at 10:11

## 2020-01-28 RX ADMIN — FOLIC ACID SCH MG: 1 TABLET ORAL at 10:11

## 2020-01-28 RX ADMIN — HYDRALAZINE HYDROCHLORIDE SCH MG: 25 TABLET, FILM COATED ORAL at 06:29

## 2020-01-28 RX ADMIN — MAGNESIUM OXIDE TAB 400 MG (241.3 MG ELEMENTAL MG) SCH MG: 400 (241.3 MG) TAB at 10:11

## 2020-01-28 RX ADMIN — INSULIN ASPART SCH: 100 INJECTION, SOLUTION INTRAVENOUS; SUBCUTANEOUS at 11:49

## 2020-01-28 RX ADMIN — INSULIN ASPART SCH: 100 INJECTION, SOLUTION INTRAVENOUS; SUBCUTANEOUS at 06:29

## 2020-01-28 RX ADMIN — ASPIRIN SCH MG: 81 TABLET, COATED ORAL at 10:12

## 2020-01-28 RX ADMIN — CEFTRIAXONE SCH MLS/HR: 1 INJECTION, POWDER, FOR SOLUTION INTRAMUSCULAR; INTRAVENOUS at 10:11

## 2020-01-28 NOTE — DS
Physical Examination


Vital Signs: 


 Vital Signs











Temperature  99.3 F   01/28/20 09:40


 


Pulse Rate  77   01/28/20 09:40


 


Respiratory Rate  20   01/28/20 09:40


 


Blood Pressure  140/66   01/28/20 09:40


 


O2 Sat by Pulse Oximetry (%)  95   01/27/20 21:00











Constitutional: Yes: No Distress


Eyes: Yes: Conjunctiva Clear, EOM Intact


HENT: Yes: Atraumatic, Normocephalic


Neck: Yes: Supple, Trachea Midline


Cardiovascular: Yes: Regular Rate and Rhythm, S1, S2.  No: Bradycardia, 

Tachycardia


Respiratory: Yes: Regular, CTA Bilaterally, Rales (RLL)


Gastrointestinal: Yes: Normal Bowel Sounds, Soft.  No: Abdomen, Obese


...Rectal Exam: Yes: Deferred


Renal/: No: Anuria, Bladder Distention, CVA Tenderness - Left, CVA Tenderness 

- Right


Breast(s): Yes: WNL


Extremities: No: Calf Tenderness, Cold, Cyanosis


Edema: No


Peripheral Pulses WNL: Yes


Integumentary: Yes: WNL


Neurological: Yes: WNL


...Motor Strength: WNL


Psychiatric: Yes: WNL


Labs: 


 CBC, BMP





 01/27/20 08:05 





 01/28/20 07:45 











Discharge Summary


Problems reviewed: Yes


Reason For Visit: HYPOXIA, PNA


Current Active Problems





COPD (chronic obstructive pulmonary disease) (Acute)


Diabetes 1.5, managed as type 1 (Acute)


HTN (hypertension) (Acute)


Hypokalemia (Acute)


Hypoxia (Acute)


Pneumonia (Acute)


Sepsis (Acute)








Condition: Improved





- Instructions


Referrals: 


Corona Dillon MD [Primary Care Provider] - 


Disposition: HOME





- Home Medications


Comprehensive Discharge Medication List: 


Ambulatory Orders





Aspirin Coated [Ecotrin -] 81 mg PO DAILY 05/29/12 


Atorvastatin Ca [Lipitor] 20 mg PO DAILY 05/29/12 


Diltiazem [Cardizem -] 420 mg PO DAILY 05/29/12 


Glipizide/Metformin HCl [Metaglip 2.5-250 mg Tablet] 2 tab PO DAILY 05/29/12 


Levothyroxine Sodium [Synthroid] 25 mcg PO DAILY 05/29/12 


Losartan 50Mg/Hctz 12.5MG [Hyzaar -] 50 mg PO DAILY 05/29/12 


Esomeprazole Mag Trihydrate [Nexium] 40 mg PO DAILY 02/10/14 


Folic Acid 1 mg PO DAILY 07/18/19 


Hydralazine HCl 25 mg PO DAILY 07/18/19

## 2020-01-28 NOTE — PN
Progress Note (short form)





- Note


Progress Note: 





PULMONARY





Feels better. Wants to go home. No fevers. Cough improving.





 Vital Signs











 Period  Temp  Pulse  Resp  BP Sys/Gordon  Pulse Ox


 


 Last 24 Hr  98.5 F-99.4 F  77-81  20-20  140-174/66-83  95











Gen:  NAD at rest


Heart: RRR


Lung: decreased breath sounds at the bases


Abd: soft, nontender


Ext: no edema





 CBC, BMP





 01/27/20 08:05 





 01/28/20 07:45 





Active Medications





Acetaminophen (Tylenol -)  500 mg PO Q6H PRN


   PRN Reason: FEVER


Albuterol/Ipratropium (Duoneb -)  1 amp NEB RTID [facility]


   Last Admin: 01/28/20 08:30 Dose:  1 amp


Aspirin (Ecotrin -)  81 mg PO DAILY [facility]


   Last Admin: 01/28/20 10:12 Dose:  81 mg


Atorvastatin Calcium (Lipitor -)  20 mg PO HS [facility]


   Last Admin: 01/27/20 22:40 Dose:  20 mg


Diltiazem HCl (Cardizem Cd -)  180 mg PO DAILY [facility]


   Last Admin: 01/28/20 10:18 Dose:  180 mg


Docusate Sodium (Colace -)  300 mg PO [facility]


   Last Admin: 01/27/20 22:40 Dose:  Not Given


Folic Acid (Folic Acid -)  1 mg PO DAILY [facility]


   Last Admin: 01/28/20 10:11 Dose:  1 mg


Hydralazine HCl (Apresoline -)  25 mg PO TID [facility]


   Last Admin: 01/28/20 06:29 Dose:  25 mg


Ceftriaxone Sodium 1 gm/ (Dextrose)  50 mls @ 200 mls/hr IVPB DAILY [facility]; 

Protocol


   Last Admin: 01/28/20 10:11 Dose:  200 mls/hr


Azithromycin (Zithromax 500mg Ivpb (Pre-Docked))  500 mg in 250 mls @ 250 mls/

hr IVPB Q24H [facility]


   Last Admin: 01/27/20 15:06 Dose:  250 mls/hr


Insulin Aspart (Novolog Vial Sliding Scale -)  1 vial SQ ACHS [facility]; Protocol


   Last Admin: 01/28/20 11:49 Dose:  Not Given


Levothyroxine Sodium (Synthroid -)  25 mcg PO DAILY@0700 [facility]


   Last Admin: 01/28/20 06:29 Dose:  25 mcg


Losartan Potassium (Cozaar -)  50 mg PO DAILY [facility]


   Last Admin: 01/28/20 10:12 Dose:  50 mg


Magnesium Oxide (Mag-Ox -)  400 mg PO DAILY [facility]


   Last Admin: 01/28/20 10:11 Dose:  400 mg


Metformin HCl (Glucophage Xr -)  750 mg PO DAILY@0700 [facility]


   Last Admin: 01/28/20 06:30 Dose:  750 mg


Pantoprazole Sodium (Protonix -)  40 mg PO DAILY [facility]


   Last Admin: 01/28/20 10:11 Dose:  40 mg


Potassium Chloride (K-Dur -)  20 meq PO DAILY [facility]


   Last Admin: 01/28/20 10:11 Dose:  20 meq








A/P


Pneumonia


Sepsis


Lactic Acidosis


Acute Kidney Injury


Psoriatic Arthritis


HTN


DM


h/o AAA repair


Hypothyroidism





-  antibiotics per ID


-  inhaled bronchodilators as needed


-  O2 to keep SpO2 >90%


-  replete lytes


-  DVT prophylaxis


-  can d/c home from pulmonary standpoint

## 2020-01-28 NOTE — PN
Progress Note (short form)





- Note


Progress Note: 





Feels well, no fever.


Mild cough.


K 4.1


Dr Joseph consult appreciated.


 Vital Signs (72 hours)











  01/25/20 01/25/20 01/25/20





  16:04 18:00 21:00


 


Temperature 99.6 F 99.7 F H 


 


Pulse Rate 77 82 


 


Respiratory 18 18 





Rate   


 


Blood Pressure 150/71 145/88 


 


O2 Sat by Pulse   95





Oximetry (%)   














  01/25/20 01/26/20 01/26/20





  22:00 06:00 09:00


 


Temperature 99.0 F 98.8 F 


 


Pulse Rate 75 73 


 


Respiratory 18 18 18





Rate   


 


Blood Pressure 138/64 156/76 


 


O2 Sat by Pulse   94 L





Oximetry (%)   














  01/26/20 01/26/20 01/26/20





  10:00 13:53 15:00


 


Temperature 98.9 F  99.9 F H


 


Pulse Rate 81 93 H 86


 


Respiratory 18  18





Rate   


 


Blood Pressure 119/49 L  151/74


 


O2 Sat by Pulse  94 L 





Oximetry (%)   














  01/26/20 01/26/20 01/26/20





  18:20 21:00 22:00


 


Temperature 100 F H  97.9 F


 


Pulse Rate 87  98 H


 


Respiratory 18  18





Rate   


 


Blood Pressure 157/84  151/93


 


O2 Sat by Pulse  97 





Oximetry (%)   














  01/27/20 01/27/20 01/27/20





  02:00 06:00 09:49


 


Temperature 100.5 F H 99.6 F 98.7 F


 


Pulse Rate 76 71 76


 


Respiratory 18 18 20





Rate   


 


Blood Pressure 141/75 143/76 149/81


 


O2 Sat by Pulse   





Oximetry (%)   














  01/27/20 01/27/20 01/27/20





  10:00 14:00 14:31


 


Temperature  99.0 F 


 


Pulse Rate  81 79


 


Respiratory  20 20





Rate   


 


Blood Pressure  140/70 174/75 H


 


O2 Sat by Pulse 96  





Oximetry (%)   














  01/27/20 01/27/20 01/27/20





  18:00 21:00 22:00


 


Temperature 99.4 F  98.5 F


 


Pulse Rate 78  79


 


Respiratory 20  20





Rate   


 


Blood Pressure 153/76  144/75


 


O2 Sat by Pulse  95 





Oximetry (%)   














  01/28/20 01/28/20





  06:00 09:40


 


Temperature 98.9 F 99.3 F


 


Pulse Rate 77 77


 


Respiratory 20 20





Rate  


 


Blood Pressure 141/83 140/66


 


O2 Sat by Pulse  





Oximetry (%)  








Awake, alert, NAD


Lungs keegan RLL crackles.


CXR-no change.


Abdomen soft, NT


Ext-no CCE


 Abnormal Lab Results











  01/27/20 01/28/20





  08:05 07:45


 


Neutrophils % (Manual)  39.6 L 


 


Monocytes % (Manual)  13 H 


 


Blast Cells % (Manual)  1 H D 


 


Metamyelocytes  5 H D 


 


Anion Gap   5 L


 


Random Glucose   139 H








 Laboratory Results - last 24 hr











  01/27/20 01/27/20 01/27/20





  08:05 17:25 22:32


 


Neutrophils % (Manual)  39.6 L  


 


Band Neutrophils %  4.9  


 


Lymphocytes % (Manual)  13.9  


 


Monocytes % (Manual)  13 H  


 


Eosinophils % (Manual)  3.0  D  


 


Basophils % (Manual)  0.0  


 


Myelocytes % (Man)  2  D  


 


Promyelocytes % (Man)  0  


 


Blast Cells % (Manual)  1 H D  


 


Nucleated RBC %  0  


 


Metamyelocytes  5 H D  


 


Hypochromia  0  


 


Platelet Estimate  Normal  


 


Platelet Comment  Present  


 


Polychromasia  1+  


 


Poikilocytosis  1+  


 


Anisocytosis  1+  


 


Microcytosis  1+  


 


Macrocytosis  0  


 


Spherocytes  1+  


 


Ovalocytes  1+  


 


Sodium   


 


Potassium   


 


Chloride   


 


Carbon Dioxide   


 


Anion Gap   


 


BUN   


 


Creatinine   


 


Est GFR (CKD-EPI)AfAm   


 


Est GFR (CKD-EPI)NonAf   


 


POC Glucometer   126  116


 


Random Glucose   


 


Calcium   














  01/28/20 01/28/20 01/28/20





  06:22 07:45 11:45


 


Neutrophils % (Manual)   


 


Band Neutrophils %   


 


Lymphocytes % (Manual)   


 


Monocytes % (Manual)   


 


Eosinophils % (Manual)   


 


Basophils % (Manual)   


 


Myelocytes % (Man)   


 


Promyelocytes % (Man)   


 


Blast Cells % (Manual)   


 


Nucleated RBC %   


 


Metamyelocytes   


 


Hypochromia   


 


Platelet Estimate   


 


Platelet Comment   


 


Polychromasia   


 


Poikilocytosis   


 


Anisocytosis   


 


Microcytosis   


 


Macrocytosis   


 


Spherocytes   


 


Ovalocytes   


 


Sodium   141 


 


Potassium   4.1 


 


Chloride   106 


 


Carbon Dioxide   30 


 


Anion Gap   5 L 


 


BUN   8.3 


 


Creatinine   0.8 


 


Est GFR (CKD-EPI)AfAm   84.18 


 


Est GFR (CKD-EPI)NonAf   72.63 


 


POC Glucometer  88   98


 


Random Glucose   139 H 


 


Calcium   9.0 








 Current Active Problems











Problem Status Onset


 


COPD (chronic obstructive pulmonary disease) Acute 


 


Diabetes 1.5, managed as type 1 Acute 


 


HTN (hypertension) Acute 


 


Hypokalemia Acute 


 


Hypoxia Acute 


 


Pneumonia Acute 


 


Sepsis Acute 








Plan d/c home on PO Ceftin


F/u at the office in 2-3 days.


F/u Dr Carrillo re aneurysm from 3.7 to 4.3 cm.


Given CT copy to pt.





Problem List





- Problems


(1) Hypoxia


Code(s): R09.02 - HYPOXEMIA   





(2) Pneumonia


Code(s): J18.9 - PNEUMONIA, UNSPECIFIED ORGANISM   


Qualifiers: 


   Pneumonia type: due to unspecified organism   Laterality: unspecified 

laterality   Lung location: unspecified part of lung   Qualified Code(s): J18.9 

- Pneumonia, unspecified organism   





(3) Sepsis


Code(s): A41.9 - SEPSIS, UNSPECIFIED ORGANISM   


Qualifiers: 


   Sepsis type: sepsis due to unspecified organism   Sepsis acute organ 

dysfunction status: without acute organ dysfunction   Qualified Code(s): A41.9 

- Sepsis, unspecified organism   





(4) Arthritis


Code(s): M19.90 - UNSPECIFIED OSTEOARTHRITIS, UNSPECIFIED SITE   





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


Code(s): E13.9 - OTHER SPECIFIED DIABETES MELLITUS WITHOUT COMPLICATIONS   





(6) HTN (hypertension)


Code(s): I10 - ESSENTIAL (PRIMARY) HYPERTENSION   


Qualifiers: 


   Hypertension type: essential hypertension   Qualified Code(s): I10 - 

Essential (primary) hypertension   





(7) Hypokalemia


Code(s): E87.6 - HYPOKALEMIA

## 2022-03-15 ENCOUNTER — HOSPITAL ENCOUNTER (OUTPATIENT)
Dept: HOSPITAL 74 - FINFUSION | Age: 77
Discharge: HOME | End: 2022-03-15
Payer: COMMERCIAL

## 2022-03-15 VITALS — DIASTOLIC BLOOD PRESSURE: 65 MMHG | TEMPERATURE: 98 F | SYSTOLIC BLOOD PRESSURE: 133 MMHG | HEART RATE: 76 BPM

## 2022-03-15 DIAGNOSIS — D50.9: Primary | ICD-10-CM

## 2022-03-15 PROCEDURE — 3E033GC INTRODUCTION OF OTHER THERAPEUTIC SUBSTANCE INTO PERIPHERAL VEIN, PERCUTANEOUS APPROACH: ICD-10-PCS

## 2022-03-22 ENCOUNTER — HOSPITAL ENCOUNTER (OUTPATIENT)
Dept: HOSPITAL 74 - FINFUSION | Age: 77
Discharge: HOME | End: 2022-03-22
Payer: COMMERCIAL

## 2022-03-22 VITALS — DIASTOLIC BLOOD PRESSURE: 48 MMHG | SYSTOLIC BLOOD PRESSURE: 125 MMHG | HEART RATE: 58 BPM

## 2022-03-22 VITALS — TEMPERATURE: 98.5 F

## 2022-03-22 DIAGNOSIS — D50.9: Primary | ICD-10-CM

## 2022-03-22 PROCEDURE — 3E033GC INTRODUCTION OF OTHER THERAPEUTIC SUBSTANCE INTO PERIPHERAL VEIN, PERCUTANEOUS APPROACH: ICD-10-PCS

## 2022-11-23 ENCOUNTER — RX ONLY (RX ONLY)
Age: 77
End: 2022-11-23

## 2022-11-23 ENCOUNTER — OFFICE (OUTPATIENT)
Dept: URBAN - METROPOLITAN AREA CLINIC 121 | Facility: CLINIC | Age: 77
Setting detail: OPHTHALMOLOGY
End: 2022-11-23
Payer: MEDICARE

## 2022-11-23 DIAGNOSIS — H26.491: ICD-10-CM

## 2022-11-23 PROCEDURE — 66821 AFTER CATARACT LASER SURGERY: CPT | Performed by: OPHTHALMOLOGY

## 2022-11-23 ASSESSMENT — SUPERFICIAL PUNCTATE KERATITIS (SPK)
OS_SPK: 1+
OD_SPK: 1+

## 2022-11-23 ASSESSMENT — VISUAL ACUITY
OS_BCVA: 20/40
OD_BCVA: 20/40

## 2022-11-23 ASSESSMENT — REFRACTION_AUTOREFRACTION
OS_SPHERE: -0.50
OD_SPHERE: -2.25
OS_CYLINDER: +3.25
OD_CYLINDER: +2.50
OS_AXIS: 178
OD_AXIS: 011

## 2022-11-23 ASSESSMENT — SPHEQUIV_DERIVED
OD_SPHEQUIV: -1
OS_SPHEQUIV: 1.125
OS_SPHEQUIV: 2.5
OD_SPHEQUIV: 2

## 2022-11-23 ASSESSMENT — KERATOMETRY
OD_AXISANGLE_DEGREES: 019
OD_K1POWER_DIOPTERS: 43.00
METHOD_AUTO_MANUAL: AUTO
OS_AXISANGLE_DEGREES: 169
OS_K2POWER_DIOPTERS: 44.00
OD_K2POWER_DIOPTERS: 43.50
OS_K1POWER_DIOPTERS: 42.75

## 2022-11-23 ASSESSMENT — AXIALLENGTH_DERIVED
OS_AL: 23.2059
OS_AL: 22.6989
OD_AL: 24.0834
OD_AL: 22.924

## 2022-11-23 ASSESSMENT — CORNEAL DYSTROPHY - POSTERIOR
OS_POSTERIORDYSTROPHY: 1+ GUTTATA
OD_POSTERIORDYSTROPHY: 1+ GUTTATA

## 2022-11-23 ASSESSMENT — REFRACTION_MANIFEST
OS_CYLINDER: +0.50
OD_CYLINDER: +0.50
OD_AXIS: 010
OD_SPHERE: +1.75
OS_SPHERE: +2.25
OS_AXIS: 180

## 2022-11-23 ASSESSMENT — CORNEAL EDEMA - FOLDS/STRIAE
OD_FOLDSSTRIAE: 1+
OS_FOLDSSTRIAE: 1+

## 2022-11-23 ASSESSMENT — CONFRONTATIONAL VISUAL FIELD TEST (CVF)
OD_FINDINGS: FULL
OS_FINDINGS: FULL

## 2022-11-30 ENCOUNTER — OFFICE (OUTPATIENT)
Dept: URBAN - METROPOLITAN AREA CLINIC 121 | Facility: CLINIC | Age: 77
Setting detail: OPHTHALMOLOGY
End: 2022-11-30
Payer: MEDICARE

## 2022-11-30 DIAGNOSIS — H26.492: ICD-10-CM

## 2022-11-30 PROBLEM — H00.14 CHALAZION; LEFT UPPER LID: Status: ACTIVE | Noted: 2022-11-23

## 2022-11-30 PROCEDURE — 66821 AFTER CATARACT LASER SURGERY: CPT | Performed by: OPHTHALMOLOGY

## 2022-11-30 ASSESSMENT — REFRACTION_MANIFEST
OD_AXIS: 010
OD_CYLINDER: +0.50
OD_SPHERE: +1.75
OS_CYLINDER: +0.50
OS_SPHERE: +2.25
OS_AXIS: 180

## 2022-11-30 ASSESSMENT — CONFRONTATIONAL VISUAL FIELD TEST (CVF)
OD_FINDINGS: FULL
OS_FINDINGS: FULL

## 2022-11-30 ASSESSMENT — SUPERFICIAL PUNCTATE KERATITIS (SPK)
OS_SPK: 1+
OD_SPK: 1+

## 2022-11-30 ASSESSMENT — REFRACTION_AUTOREFRACTION
OD_CYLINDER: +2.50
OS_SPHERE: -0.50
OS_AXIS: 178
OD_SPHERE: -2.25
OS_CYLINDER: +3.25
OD_AXIS: 011

## 2022-11-30 ASSESSMENT — CORNEAL DYSTROPHY - POSTERIOR
OS_POSTERIORDYSTROPHY: 1+ GUTTATA
OD_POSTERIORDYSTROPHY: 1+ GUTTATA

## 2022-11-30 ASSESSMENT — CORNEAL EDEMA - FOLDS/STRIAE
OS_FOLDSSTRIAE: 1+
OD_FOLDSSTRIAE: 1+

## 2022-11-30 ASSESSMENT — VISUAL ACUITY
OS_BCVA: 20/40
OD_BCVA: 20/40

## 2022-11-30 ASSESSMENT — SPHEQUIV_DERIVED
OD_SPHEQUIV: 2
OS_SPHEQUIV: 1.125
OD_SPHEQUIV: -1
OS_SPHEQUIV: 2.5

## 2023-02-06 ENCOUNTER — HOSPITAL ENCOUNTER (INPATIENT)
Dept: HOSPITAL 74 - JER | Age: 78
LOS: 4 days | Discharge: NURSING, REHAB OR LONG-TERM CARE | DRG: 563 | End: 2023-02-10
Attending: INTERNAL MEDICINE | Admitting: HOSPITALIST
Payer: COMMERCIAL

## 2023-02-06 VITALS — BODY MASS INDEX: 22.4 KG/M2

## 2023-02-06 DIAGNOSIS — S32.591A: ICD-10-CM
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
## 2023-02-06 DIAGNOSIS — I10: ICD-10-CM

## 2023-02-06 DIAGNOSIS — E11.42: ICD-10-CM

## 2023-02-06 DIAGNOSIS — E03.9: ICD-10-CM

## 2023-02-06 DIAGNOSIS — D72.829: ICD-10-CM

## 2023-02-06 DIAGNOSIS — E11.65: ICD-10-CM

## 2023-02-06 DIAGNOSIS — J44.9: ICD-10-CM

## 2023-02-06 DIAGNOSIS — M25.551: ICD-10-CM

## 2023-02-06 DIAGNOSIS — I45.10: ICD-10-CM

## 2023-02-06 DIAGNOSIS — L40.50: ICD-10-CM
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
## 2023-02-06 DIAGNOSIS — R94.31: ICD-10-CM

## 2023-02-06 DIAGNOSIS — M48.00: ICD-10-CM

## 2023-02-06 DIAGNOSIS — R01.1: ICD-10-CM

## 2023-02-06 DIAGNOSIS — N17.9: ICD-10-CM

## 2023-02-06 DIAGNOSIS — S32.511A: ICD-10-CM

## 2023-02-06 DIAGNOSIS — S42.354A: Primary | ICD-10-CM

## 2023-02-06 DIAGNOSIS — E87.6: ICD-10-CM

## 2023-02-06 DIAGNOSIS — Z87.11: ICD-10-CM

## 2023-02-06 DIAGNOSIS — R50.9: ICD-10-CM

## 2023-02-06 DIAGNOSIS — E78.5: ICD-10-CM

## 2023-02-06 LAB
ALBUMIN SERPL-MCNC: 4.3 G/DL (ref 3.4–5)
ALP SERPL-CCNC: 59 U/L (ref 45–117)
ALT SERPL-CCNC: 38 U/L (ref 13–61)
ANION GAP SERPL CALC-SCNC: 14 MMOL/L (ref 8–16)
ANISOCYTOSIS BLD QL: 0
AST SERPL-CCNC: 28 U/L (ref 15–37)
BILIRUB SERPL-MCNC: 0.5 MG/DL (ref 0.2–1)
BUN SERPL-MCNC: 19.5 MG/DL (ref 7–18)
CALCIUM SERPL-MCNC: 9.8 MG/DL (ref 8.5–10.1)
CHLORIDE SERPL-SCNC: 98 MMOL/L (ref 98–107)
CO2 SERPL-SCNC: 26 MMOL/L (ref 21–32)
CREAT SERPL-MCNC: 1.4 MG/DL (ref 0.55–1.3)
DEPRECATED RDW RBC AUTO: 14.8 % (ref 11.6–15.6)
GLUCOSE SERPL-MCNC: 206 MG/DL (ref 74–106)
HCT VFR BLD CALC: 41.2 % (ref 32.4–45.2)
HGB BLD-MCNC: 13.3 GM/DL (ref 10.7–15.3)
MACROCYTES BLD QL: 0
MAGNESIUM SERPL-MCNC: 1.3 MG/DL (ref 1.8–2.4)
MCH RBC QN AUTO: 29.4 PG (ref 25.7–33.7)
MCHC RBC AUTO-ENTMCNC: 32.4 G/DL (ref 32–36)
MCV RBC: 90.7 FL (ref 80–96)
PHOSPHATE SERPL-MCNC: 3.8 MG/DL (ref 2.5–4.9)
PLATELET # BLD AUTO: 305 10^3/UL (ref 134–434)
PLATELET BLD QL SMEAR: NORMAL
PMV BLD: 9.4 FL (ref 7.5–11.1)
PROT SERPL-MCNC: 8.2 G/DL (ref 6.4–8.2)
RBC # BLD AUTO: 4.54 M/MM3 (ref 3.6–5.2)
SODIUM SERPL-SCNC: 138 MMOL/L (ref 136–145)
WBC # BLD AUTO: 23.7 K/MM3 (ref 4–10)

## 2023-02-06 PROCEDURE — G0378 HOSPITAL OBSERVATION PER HR: HCPCS

## 2023-02-06 PROCEDURE — U0005 INFEC AGEN DETEC AMPLI PROBE: HCPCS

## 2023-02-06 PROCEDURE — U0003 INFECTIOUS AGENT DETECTION BY NUCLEIC ACID (DNA OR RNA); SEVERE ACUTE RESPIRATORY SYNDROME CORONAVIRUS 2 (SARS-COV-2) (CORONAVIRUS DISEASE [COVID-19]), AMPLIFIED PROBE TECHNIQUE, MAKING USE OF HIGH THROUGHPUT TECHNOLOGIES AS DESCRIBED BY CMS-2020-01-R: HCPCS

## 2023-02-07 LAB
ANION GAP SERPL CALC-SCNC: 9 MMOL/L (ref 8–16)
APPEARANCE UR: CLEAR
APTT BLD: 28.4 SECONDS (ref 25.2–36.5)
BASOPHILS # BLD: 0.5 % (ref 0–2)
BILIRUB UR STRIP.AUTO-MCNC: NEGATIVE MG/DL
BUN SERPL-MCNC: 14.8 MG/DL (ref 7–18)
CALCIUM SERPL-MCNC: 9.4 MG/DL (ref 8.5–10.1)
CHLORIDE SERPL-SCNC: 104 MMOL/L (ref 98–107)
CO2 SERPL-SCNC: 27 MMOL/L (ref 21–32)
COLOR UR: YELLOW
CREAT SERPL-MCNC: 1 MG/DL (ref 0.55–1.3)
DEPRECATED RDW RBC AUTO: 14.9 % (ref 11.6–15.6)
EOSINOPHIL # BLD: 0.4 % (ref 0–4.5)
GLUCOSE SERPL-MCNC: 102 MG/DL (ref 74–106)
HCT VFR BLD CALC: 35.9 % (ref 32.4–45.2)
HGB BLD-MCNC: 12.4 GM/DL (ref 10.7–15.3)
INR BLD: 1.05 (ref 0.83–1.09)
KETONES UR QL STRIP: NEGATIVE
LEUKOCYTE ESTERASE UR QL STRIP.AUTO: NEGATIVE
LYMPHOCYTES # BLD: 13.5 % (ref 8–40)
MCH RBC QN AUTO: 30.7 PG (ref 25.7–33.7)
MCHC RBC AUTO-ENTMCNC: 34.5 G/DL (ref 32–36)
MCV RBC: 89.1 FL (ref 80–96)
MONOCYTES # BLD AUTO: 8.7 % (ref 3.8–10.2)
NEUTROPHILS # BLD: 76.9 % (ref 42.8–82.8)
NITRITE UR QL STRIP: NEGATIVE
PH UR: 7 [PH] (ref 5–8)
PLATELET # BLD AUTO: 263 10^3/UL (ref 134–434)
PMV BLD: 8.5 FL (ref 7.5–11.1)
PROT UR QL STRIP: (no result)
PROT UR QL STRIP: NEGATIVE
PT PNL PPP: 12.2 SEC (ref 9.7–13)
RBC # BLD AUTO: 4.03 M/MM3 (ref 3.6–5.2)
SODIUM SERPL-SCNC: 139 MMOL/L (ref 136–145)
SP GR UR: 1.02 (ref 1.01–1.03)
UROBILINOGEN UR STRIP-MCNC: 0.2 MG/DL (ref 0.2–1)
WBC # BLD AUTO: 15.1 K/MM3 (ref 4–10)

## 2023-02-07 RX ADMIN — HEPARIN SODIUM SCH UNIT: 5000 INJECTION, SOLUTION INTRAVENOUS; SUBCUTANEOUS at 22:22

## 2023-02-07 RX ADMIN — POTASSIUM CHLORIDE SCH MLS/HR: 7.46 INJECTION, SOLUTION INTRAVENOUS at 04:31

## 2023-02-07 RX ADMIN — POTASSIUM CHLORIDE SCH MEQ: 1500 TABLET, EXTENDED RELEASE ORAL at 16:52

## 2023-02-07 RX ADMIN — INSULIN ASPART SCH: 100 INJECTION, SOLUTION INTRAVENOUS; SUBCUTANEOUS at 06:25

## 2023-02-07 RX ADMIN — POTASSIUM CHLORIDE SCH MLS/HR: 7.46 INJECTION, SOLUTION INTRAVENOUS at 05:55

## 2023-02-07 RX ADMIN — INSULIN ASPART SCH: 100 INJECTION, SOLUTION INTRAVENOUS; SUBCUTANEOUS at 11:15

## 2023-02-07 RX ADMIN — INSULIN ASPART SCH: 100 INJECTION, SOLUTION INTRAVENOUS; SUBCUTANEOUS at 22:22

## 2023-02-07 RX ADMIN — INSULIN ASPART SCH: 100 INJECTION, SOLUTION INTRAVENOUS; SUBCUTANEOUS at 16:52

## 2023-02-08 LAB
ALBUMIN SERPL-MCNC: 3.4 G/DL (ref 3.4–5)
ALP SERPL-CCNC: 45 U/L (ref 45–117)
ALT SERPL-CCNC: 27 U/L (ref 13–61)
ANION GAP SERPL CALC-SCNC: 13 MMOL/L (ref 8–16)
AST SERPL-CCNC: 17 U/L (ref 15–37)
BILIRUB SERPL-MCNC: 0.6 MG/DL (ref 0.2–1)
BUN SERPL-MCNC: 13.9 MG/DL (ref 7–18)
CALCIUM SERPL-MCNC: 9.2 MG/DL (ref 8.5–10.1)
CHLORIDE SERPL-SCNC: 95 MMOL/L (ref 98–107)
CO2 SERPL-SCNC: 28 MMOL/L (ref 21–32)
CREAT SERPL-MCNC: 0.9 MG/DL (ref 0.55–1.3)
DEPRECATED RDW RBC AUTO: 14.8 % (ref 11.6–15.6)
GLUCOSE SERPL-MCNC: 188 MG/DL (ref 74–106)
HCT VFR BLD CALC: 38.2 % (ref 32.4–45.2)
HGB BLD-MCNC: 12.7 GM/DL (ref 10.7–15.3)
MAGNESIUM SERPL-MCNC: 2 MG/DL (ref 1.8–2.4)
MCH RBC QN AUTO: 29.8 PG (ref 25.7–33.7)
MCHC RBC AUTO-ENTMCNC: 33.1 G/DL (ref 32–36)
MCV RBC: 90 FL (ref 80–96)
PHOSPHATE SERPL-MCNC: 2.3 MG/DL (ref 2.5–4.9)
PLATELET # BLD AUTO: 244 10^3/UL (ref 134–434)
PMV BLD: 9.1 FL (ref 7.5–11.1)
PROT SERPL-MCNC: 7.4 G/DL (ref 6.4–8.2)
RBC # BLD AUTO: 4.25 M/MM3 (ref 3.6–5.2)
SODIUM SERPL-SCNC: 135 MMOL/L (ref 136–145)
WBC # BLD AUTO: 17.6 K/MM3 (ref 4–10)

## 2023-02-08 RX ADMIN — POTASSIUM CHLORIDE SCH MEQ: 1500 TABLET, EXTENDED RELEASE ORAL at 10:58

## 2023-02-08 RX ADMIN — INSULIN ASPART SCH UNITS: 100 INJECTION, SOLUTION INTRAVENOUS; SUBCUTANEOUS at 06:32

## 2023-02-08 RX ADMIN — NEBIVOLOL HYDROCHLORIDE SCH MG: 10 TABLET ORAL at 10:58

## 2023-02-08 RX ADMIN — ATORVASTATIN CALCIUM SCH MG: 20 TABLET, FILM COATED ORAL at 21:53

## 2023-02-08 RX ADMIN — HEPARIN SODIUM SCH UNIT: 5000 INJECTION, SOLUTION INTRAVENOUS; SUBCUTANEOUS at 06:32

## 2023-02-08 RX ADMIN — INSULIN ASPART SCH UNITS: 100 INJECTION, SOLUTION INTRAVENOUS; SUBCUTANEOUS at 11:41

## 2023-02-08 RX ADMIN — HEPARIN SODIUM SCH UNIT: 5000 INJECTION, SOLUTION INTRAVENOUS; SUBCUTANEOUS at 21:53

## 2023-02-08 RX ADMIN — HEPARIN SODIUM SCH UNIT: 5000 INJECTION, SOLUTION INTRAVENOUS; SUBCUTANEOUS at 13:22

## 2023-02-08 RX ADMIN — INSULIN ASPART SCH: 100 INJECTION, SOLUTION INTRAVENOUS; SUBCUTANEOUS at 21:52

## 2023-02-08 RX ADMIN — HYDRALAZINE HYDROCHLORIDE SCH MG: 25 TABLET, FILM COATED ORAL at 21:53

## 2023-02-08 RX ADMIN — INSULIN ASPART SCH: 100 INJECTION, SOLUTION INTRAVENOUS; SUBCUTANEOUS at 16:42

## 2023-02-09 LAB
ANION GAP SERPL CALC-SCNC: 7 MMOL/L (ref 8–16)
BUN SERPL-MCNC: 18.3 MG/DL (ref 7–18)
CALCIUM SERPL-MCNC: 9.3 MG/DL (ref 8.5–10.1)
CHLORIDE SERPL-SCNC: 101 MMOL/L (ref 98–107)
CO2 SERPL-SCNC: 29 MMOL/L (ref 21–32)
CREAT SERPL-MCNC: 0.9 MG/DL (ref 0.55–1.3)
DEPRECATED RDW RBC AUTO: 15 % (ref 11.6–15.6)
GLUCOSE SERPL-MCNC: 208 MG/DL (ref 74–106)
HCT VFR BLD CALC: 36.5 % (ref 32.4–45.2)
HGB BLD-MCNC: 12.5 GM/DL (ref 10.7–15.3)
MAGNESIUM SERPL-MCNC: 2.1 MG/DL (ref 1.8–2.4)
MCH RBC QN AUTO: 30.6 PG (ref 25.7–33.7)
MCHC RBC AUTO-ENTMCNC: 34.1 G/DL (ref 32–36)
MCV RBC: 89.9 FL (ref 80–96)
PHOSPHATE SERPL-MCNC: 2.7 MG/DL (ref 2.5–4.9)
PLATELET # BLD AUTO: 240 10^3/UL (ref 134–434)
PMV BLD: 8.9 FL (ref 7.5–11.1)
RBC # BLD AUTO: 4.07 M/MM3 (ref 3.6–5.2)
SODIUM SERPL-SCNC: 137 MMOL/L (ref 136–145)
WBC # BLD AUTO: 16.7 K/MM3 (ref 4–10)

## 2023-02-09 RX ADMIN — INSULIN ASPART SCH UNITS: 100 INJECTION, SOLUTION INTRAVENOUS; SUBCUTANEOUS at 21:29

## 2023-02-09 RX ADMIN — INSULIN ASPART SCH: 100 INJECTION, SOLUTION INTRAVENOUS; SUBCUTANEOUS at 06:55

## 2023-02-09 RX ADMIN — HEPARIN SODIUM SCH UNIT: 5000 INJECTION, SOLUTION INTRAVENOUS; SUBCUTANEOUS at 13:03

## 2023-02-09 RX ADMIN — ATORVASTATIN CALCIUM SCH MG: 20 TABLET, FILM COATED ORAL at 21:26

## 2023-02-09 RX ADMIN — HYDRALAZINE HYDROCHLORIDE SCH MG: 25 TABLET, FILM COATED ORAL at 06:50

## 2023-02-09 RX ADMIN — HYDRALAZINE HYDROCHLORIDE SCH MG: 25 TABLET, FILM COATED ORAL at 21:27

## 2023-02-09 RX ADMIN — POTASSIUM CHLORIDE SCH MEQ: 1500 TABLET, EXTENDED RELEASE ORAL at 09:55

## 2023-02-09 RX ADMIN — LEVOTHYROXINE SODIUM SCH MCG: 25 TABLET ORAL at 06:50

## 2023-02-09 RX ADMIN — INSULIN ASPART SCH: 100 INJECTION, SOLUTION INTRAVENOUS; SUBCUTANEOUS at 11:55

## 2023-02-09 RX ADMIN — HYDRALAZINE HYDROCHLORIDE SCH MG: 25 TABLET, FILM COATED ORAL at 13:03

## 2023-02-09 RX ADMIN — HEPARIN SODIUM SCH UNIT: 5000 INJECTION, SOLUTION INTRAVENOUS; SUBCUTANEOUS at 06:50

## 2023-02-09 RX ADMIN — LOSARTAN POTASSIUM SCH MG: 50 TABLET, FILM COATED ORAL at 09:55

## 2023-02-09 RX ADMIN — HEPARIN SODIUM SCH UNIT: 5000 INJECTION, SOLUTION INTRAVENOUS; SUBCUTANEOUS at 21:27

## 2023-02-09 RX ADMIN — INSULIN ASPART SCH: 100 INJECTION, SOLUTION INTRAVENOUS; SUBCUTANEOUS at 16:27

## 2023-02-09 RX ADMIN — NEBIVOLOL HYDROCHLORIDE SCH MG: 10 TABLET ORAL at 09:54

## 2023-02-10 VITALS
SYSTOLIC BLOOD PRESSURE: 142 MMHG | RESPIRATION RATE: 18 BRPM | DIASTOLIC BLOOD PRESSURE: 68 MMHG | HEART RATE: 65 BPM | TEMPERATURE: 99.2 F

## 2023-02-10 LAB
ANION GAP SERPL CALC-SCNC: 8 MMOL/L (ref 8–16)
BUN SERPL-MCNC: 23.6 MG/DL (ref 7–18)
CALCIUM SERPL-MCNC: 9.1 MG/DL (ref 8.5–10.1)
CHLORIDE SERPL-SCNC: 99 MMOL/L (ref 98–107)
CO2 SERPL-SCNC: 27 MMOL/L (ref 21–32)
CREAT SERPL-MCNC: 1 MG/DL (ref 0.55–1.3)
DEPRECATED RDW RBC AUTO: 15.2 % (ref 11.6–15.6)
GLUCOSE SERPL-MCNC: 147 MG/DL (ref 74–106)
HCT VFR BLD CALC: 37.2 % (ref 32.4–45.2)
HGB BLD-MCNC: 12.1 GM/DL (ref 10.7–15.3)
MAGNESIUM SERPL-MCNC: 2.3 MG/DL (ref 1.8–2.4)
MCH RBC QN AUTO: 29.5 PG (ref 25.7–33.7)
MCHC RBC AUTO-ENTMCNC: 32.6 G/DL (ref 32–36)
MCV RBC: 90.4 FL (ref 80–96)
PHOSPHATE SERPL-MCNC: 3 MG/DL (ref 2.5–4.9)
PLATELET # BLD AUTO: 283 10^3/UL (ref 134–434)
PMV BLD: 9.3 FL (ref 7.5–11.1)
RBC # BLD AUTO: 4.11 M/MM3 (ref 3.6–5.2)
SODIUM SERPL-SCNC: 134 MMOL/L (ref 136–145)
WBC # BLD AUTO: 14.8 K/MM3 (ref 4–10)

## 2023-02-10 RX ADMIN — INSULIN ASPART SCH UNITS: 100 INJECTION, SOLUTION INTRAVENOUS; SUBCUTANEOUS at 11:50

## 2023-02-10 RX ADMIN — LOSARTAN POTASSIUM SCH MG: 50 TABLET, FILM COATED ORAL at 10:21

## 2023-02-10 RX ADMIN — HEPARIN SODIUM SCH UNIT: 5000 INJECTION, SOLUTION INTRAVENOUS; SUBCUTANEOUS at 07:18

## 2023-02-10 RX ADMIN — ACETAMINOPHEN SCH MG: 500 TABLET, FILM COATED ORAL at 17:27

## 2023-02-10 RX ADMIN — ACETAMINOPHEN SCH: 500 TABLET, FILM COATED ORAL at 11:45

## 2023-02-10 RX ADMIN — NEBIVOLOL HYDROCHLORIDE SCH MG: 10 TABLET ORAL at 10:21

## 2023-02-10 RX ADMIN — INSULIN ASPART SCH: 100 INJECTION, SOLUTION INTRAVENOUS; SUBCUTANEOUS at 16:46

## 2023-02-10 RX ADMIN — HYDRALAZINE HYDROCHLORIDE SCH MG: 25 TABLET, FILM COATED ORAL at 07:18

## 2023-02-10 RX ADMIN — POTASSIUM CHLORIDE SCH MEQ: 1500 TABLET, EXTENDED RELEASE ORAL at 10:21

## 2023-02-10 RX ADMIN — HEPARIN SODIUM SCH UNIT: 5000 INJECTION, SOLUTION INTRAVENOUS; SUBCUTANEOUS at 14:50

## 2023-02-10 RX ADMIN — HYDRALAZINE HYDROCHLORIDE SCH MG: 25 TABLET, FILM COATED ORAL at 14:50

## 2023-02-10 RX ADMIN — INSULIN ASPART SCH: 100 INJECTION, SOLUTION INTRAVENOUS; SUBCUTANEOUS at 07:17

## 2023-02-10 RX ADMIN — LEVOTHYROXINE SODIUM SCH MCG: 25 TABLET ORAL at 07:18

## 2023-06-02 ENCOUNTER — OFFICE (OUTPATIENT)
Dept: URBAN - METROPOLITAN AREA CLINIC 121 | Facility: CLINIC | Age: 78
Setting detail: OPHTHALMOLOGY
End: 2023-06-02
Payer: MEDICARE

## 2023-06-02 DIAGNOSIS — H40.013: ICD-10-CM

## 2023-06-02 DIAGNOSIS — H35.3131: ICD-10-CM

## 2023-06-02 DIAGNOSIS — Z96.1: ICD-10-CM

## 2023-06-02 DIAGNOSIS — E11.9: ICD-10-CM

## 2023-06-02 DIAGNOSIS — H00.14: ICD-10-CM

## 2023-06-02 DIAGNOSIS — H18.513: ICD-10-CM

## 2023-06-02 DIAGNOSIS — H21.81: ICD-10-CM

## 2023-06-02 DIAGNOSIS — H16.223: ICD-10-CM

## 2023-06-02 PROCEDURE — 92250 FUNDUS PHOTOGRAPHY W/I&R: CPT | Performed by: OPHTHALMOLOGY

## 2023-06-02 PROCEDURE — 99214 OFFICE O/P EST MOD 30 MIN: CPT | Performed by: OPHTHALMOLOGY

## 2023-06-02 ASSESSMENT — CONFRONTATIONAL VISUAL FIELD TEST (CVF)
OD_FINDINGS: FULL
OS_FINDINGS: FULL

## 2023-06-06 ASSESSMENT — AXIALLENGTH_DERIVED
OS_AL: 22.5721
OD_AL: 22.8807
OS_AL: 22.8886
OD_AL: 23.5405

## 2023-06-06 ASSESSMENT — KERATOMETRY
OD_K2POWER_DIOPTERS: 44.00
OS_K1POWER_DIOPTERS: 42.75
OS_K2POWER_DIOPTERS: 44.75
OD_K1POWER_DIOPTERS: 42.75
METHOD_AUTO_MANUAL: AUTO
OD_AXISANGLE_DEGREES: 010
OS_AXISANGLE_DEGREES: 159

## 2023-06-06 ASSESSMENT — PACHYMETRY
OS_CT_CORRECTION: -1
OS_CT_UM: 550
OD_CT_CORRECTION: 0
OD_CT_UM: 540

## 2023-06-06 ASSESSMENT — SUPERFICIAL PUNCTATE KERATITIS (SPK)
OD_SPK: 1+
OS_SPK: 1+

## 2023-06-06 ASSESSMENT — VISUAL ACUITY
OS_BCVA: 20/25
OD_BCVA: 20/30

## 2023-06-06 ASSESSMENT — CORNEAL DYSTROPHY - POSTERIOR
OS_POSTERIORDYSTROPHY: 1+ GUTTATA
OD_POSTERIORDYSTROPHY: 1+ GUTTATA

## 2023-06-06 ASSESSMENT — SPHEQUIV_DERIVED
OS_SPHEQUIV: 2.5
OS_SPHEQUIV: 1.625
OD_SPHEQUIV: 0.25
OD_SPHEQUIV: 2

## 2023-06-06 ASSESSMENT — CORNEAL EDEMA - FOLDS/STRIAE
OS_FOLDSSTRIAE: 1+
OD_FOLDSSTRIAE: 1+

## 2023-06-06 ASSESSMENT — REFRACTION_AUTOREFRACTION
OS_SPHERE: -0.25
OD_CYLINDER: +2.00
OS_AXIS: 004
OD_SPHERE: -0.75
OS_CYLINDER: +3.75
OD_AXIS: 006

## 2023-06-06 ASSESSMENT — REFRACTION_MANIFEST
OD_AXIS: 010
OS_CYLINDER: +0.50
OD_CYLINDER: +0.50
OS_AXIS: 180
OS_SPHERE: +2.25
OD_SPHERE: +1.75

## 2023-06-06 ASSESSMENT — TONOMETRY
OD_IOP_MMHG: 16
OS_IOP_MMHG: 16

## 2023-10-04 ENCOUNTER — OFFICE (OUTPATIENT)
Dept: URBAN - METROPOLITAN AREA CLINIC 121 | Facility: CLINIC | Age: 78
Setting detail: OPHTHALMOLOGY
End: 2023-10-04
Payer: MEDICARE

## 2023-10-04 DIAGNOSIS — E11.9: ICD-10-CM

## 2023-10-04 DIAGNOSIS — H35.3131: ICD-10-CM

## 2023-10-04 DIAGNOSIS — H40.013: ICD-10-CM

## 2023-10-04 DIAGNOSIS — H00.14: ICD-10-CM

## 2023-10-04 DIAGNOSIS — H16.223: ICD-10-CM

## 2023-10-04 PROCEDURE — 99213 OFFICE O/P EST LOW 20 MIN: CPT | Mod: 25 | Performed by: OPHTHALMOLOGY

## 2023-10-04 PROCEDURE — 92134 CPTRZ OPH DX IMG PST SGM RTA: CPT | Performed by: OPHTHALMOLOGY

## 2023-10-04 PROCEDURE — 11900 INJECT SKIN LESIONS </W 7: CPT | Mod: LT | Performed by: OPHTHALMOLOGY

## 2023-10-04 ASSESSMENT — VISUAL ACUITY
OD_BCVA: 20/40
OS_BCVA: 20/30

## 2023-10-04 ASSESSMENT — CONFRONTATIONAL VISUAL FIELD TEST (CVF)
OS_FINDINGS: FULL
OD_FINDINGS: FULL

## 2023-10-04 ASSESSMENT — KERATOMETRY
OD_AXISANGLE_DEGREES: 010
OD_K2POWER_DIOPTERS: 44.00
OD_K1POWER_DIOPTERS: 42.75
OS_K1POWER_DIOPTERS: 42.75
OS_AXISANGLE_DEGREES: 159
OS_K2POWER_DIOPTERS: 44.75
METHOD_AUTO_MANUAL: AUTO

## 2023-10-04 ASSESSMENT — REFRACTION_AUTOREFRACTION
OS_AXIS: 004
OD_AXIS: 006
OD_SPHERE: -0.75
OD_CYLINDER: +2.00
OS_CYLINDER: +3.75
OS_SPHERE: -0.25

## 2023-10-04 ASSESSMENT — SUPERFICIAL PUNCTATE KERATITIS (SPK)
OD_SPK: 1+
OS_SPK: 1+

## 2023-10-04 ASSESSMENT — CORNEAL EDEMA - FOLDS/STRIAE
OS_FOLDSSTRIAE: 1+
OD_FOLDSSTRIAE: 1+

## 2023-10-04 ASSESSMENT — REFRACTION_MANIFEST
OS_CYLINDER: +0.50
OD_CYLINDER: +0.50
OD_AXIS: 010
OS_SPHERE: +2.25
OD_SPHERE: +1.75
OS_AXIS: 180

## 2023-10-04 ASSESSMENT — CORNEAL DYSTROPHY - POSTERIOR
OD_POSTERIORDYSTROPHY: 1+ GUTTATA
OS_POSTERIORDYSTROPHY: 1+ GUTTATA

## 2023-10-04 ASSESSMENT — AXIALLENGTH_DERIVED
OS_AL: 22.8886
OD_AL: 22.8807
OD_AL: 23.5405
OS_AL: 22.5721

## 2023-10-04 ASSESSMENT — SPHEQUIV_DERIVED
OS_SPHEQUIV: 1.625
OD_SPHEQUIV: 2
OS_SPHEQUIV: 2.5
OD_SPHEQUIV: 0.25

## 2023-10-31 ENCOUNTER — OFFICE (OUTPATIENT)
Dept: URBAN - METROPOLITAN AREA CLINIC 121 | Facility: CLINIC | Age: 78
Setting detail: OPHTHALMOLOGY
End: 2023-10-31
Payer: MEDICARE

## 2023-10-31 DIAGNOSIS — H00.14: ICD-10-CM

## 2023-10-31 PROCEDURE — 67800 REMOVE EYELID LESION: CPT | Mod: E1 | Performed by: OPHTHALMOLOGY

## 2023-10-31 ASSESSMENT — CORNEAL EDEMA - FOLDS/STRIAE
OD_FOLDSSTRIAE: 1+
OS_FOLDSSTRIAE: 1+

## 2023-10-31 ASSESSMENT — REFRACTION_AUTOREFRACTION
OD_AXIS: 006
OS_AXIS: 004
OS_CYLINDER: +3.75
OD_SPHERE: -0.75
OD_CYLINDER: +2.00
OS_SPHERE: -0.25

## 2023-10-31 ASSESSMENT — REFRACTION_MANIFEST
OS_AXIS: 180
OD_AXIS: 010
OD_SPHERE: +1.75
OS_SPHERE: +2.25
OD_CYLINDER: +0.50
OS_CYLINDER: +0.50

## 2023-10-31 ASSESSMENT — SPHEQUIV_DERIVED
OD_SPHEQUIV: 2
OS_SPHEQUIV: 1.625
OS_SPHEQUIV: 2.5
OD_SPHEQUIV: 0.25

## 2023-10-31 ASSESSMENT — CORNEAL DYSTROPHY - POSTERIOR
OS_POSTERIORDYSTROPHY: 1+ GUTTATA
OD_POSTERIORDYSTROPHY: 1+ GUTTATA

## 2023-10-31 ASSESSMENT — CONFRONTATIONAL VISUAL FIELD TEST (CVF)
OD_FINDINGS: FULL
OS_FINDINGS: FULL

## 2023-10-31 ASSESSMENT — VISUAL ACUITY
OS_BCVA: 20/30
OD_BCVA: 20/40

## 2023-10-31 ASSESSMENT — SUPERFICIAL PUNCTATE KERATITIS (SPK)
OD_SPK: 1+
OS_SPK: 1+

## 2023-11-07 ENCOUNTER — OFFICE (OUTPATIENT)
Dept: URBAN - METROPOLITAN AREA CLINIC 121 | Facility: CLINIC | Age: 78
Setting detail: OPHTHALMOLOGY
End: 2023-11-07
Payer: MEDICARE

## 2023-11-07 DIAGNOSIS — E11.9: ICD-10-CM

## 2023-11-07 DIAGNOSIS — H40.013: ICD-10-CM

## 2023-11-07 DIAGNOSIS — Z96.1: ICD-10-CM

## 2023-11-07 DIAGNOSIS — H18.513: ICD-10-CM

## 2023-11-07 DIAGNOSIS — H00.14: ICD-10-CM

## 2023-11-07 DIAGNOSIS — H16.223: ICD-10-CM

## 2023-11-07 DIAGNOSIS — H21.81: ICD-10-CM

## 2023-11-07 DIAGNOSIS — H35.3131: ICD-10-CM

## 2023-11-07 PROCEDURE — 99024 POSTOP FOLLOW-UP VISIT: CPT | Performed by: OPHTHALMOLOGY

## 2023-11-07 ASSESSMENT — SPHEQUIV_DERIVED
OD_SPHEQUIV: 0.25
OS_SPHEQUIV: 1.625
OS_SPHEQUIV: 2.5
OD_SPHEQUIV: 2

## 2023-11-07 ASSESSMENT — REFRACTION_MANIFEST
OS_CYLINDER: +0.50
OS_AXIS: 180
OD_SPHERE: +1.75
OD_AXIS: 010
OS_SPHERE: +2.25
OD_CYLINDER: +0.50

## 2023-11-07 ASSESSMENT — REFRACTION_AUTOREFRACTION
OS_CYLINDER: +3.75
OD_SPHERE: -0.75
OD_AXIS: 006
OD_CYLINDER: +2.00
OS_SPHERE: -0.25
OS_AXIS: 004

## 2023-11-07 ASSESSMENT — CORNEAL DYSTROPHY - POSTERIOR
OD_POSTERIORDYSTROPHY: 1+ GUTTATA
OS_POSTERIORDYSTROPHY: 1+ GUTTATA

## 2023-11-07 ASSESSMENT — CORNEAL EDEMA - FOLDS/STRIAE
OD_FOLDSSTRIAE: 1+
OS_FOLDSSTRIAE: 1+

## 2023-11-07 ASSESSMENT — SUPERFICIAL PUNCTATE KERATITIS (SPK)
OD_SPK: 1+
OS_SPK: 1+

## 2023-11-07 ASSESSMENT — CONFRONTATIONAL VISUAL FIELD TEST (CVF)
OS_FINDINGS: FULL
OD_FINDINGS: FULL

## 2024-02-05 ENCOUNTER — OFFICE (OUTPATIENT)
Dept: URBAN - METROPOLITAN AREA CLINIC 121 | Facility: CLINIC | Age: 79
Setting detail: OPHTHALMOLOGY
End: 2024-02-05
Payer: MEDICARE

## 2024-02-05 DIAGNOSIS — H18.513: ICD-10-CM

## 2024-02-05 DIAGNOSIS — H40.013: ICD-10-CM

## 2024-02-05 DIAGNOSIS — H16.223: ICD-10-CM

## 2024-02-05 DIAGNOSIS — H21.81: ICD-10-CM

## 2024-02-05 DIAGNOSIS — Z96.1: ICD-10-CM

## 2024-02-05 DIAGNOSIS — E11.9: ICD-10-CM

## 2024-02-05 DIAGNOSIS — H35.3131: ICD-10-CM

## 2024-02-05 PROCEDURE — 92250 FUNDUS PHOTOGRAPHY W/I&R: CPT | Performed by: OPHTHALMOLOGY

## 2024-02-05 PROCEDURE — 92014 COMPRE OPH EXAM EST PT 1/>: CPT | Performed by: OPHTHALMOLOGY

## 2024-02-05 ASSESSMENT — REFRACTION_MANIFEST
OD_SPHERE: +1.75
OD_AXIS: 010
OS_AXIS: 180
OD_CYLINDER: +0.50
OS_CYLINDER: +0.50
OS_SPHERE: +2.25

## 2024-02-05 ASSESSMENT — CORNEAL EDEMA - FOLDS/STRIAE
OD_FOLDSSTRIAE: 1+
OS_FOLDSSTRIAE: 1+

## 2024-02-05 ASSESSMENT — CORNEAL DYSTROPHY - POSTERIOR
OS_POSTERIORDYSTROPHY: 1+ GUTTATA
OD_POSTERIORDYSTROPHY: 1+ GUTTATA

## 2024-02-05 ASSESSMENT — CONFRONTATIONAL VISUAL FIELD TEST (CVF)
OD_FINDINGS: FULL
OS_FINDINGS: FULL

## 2024-02-05 ASSESSMENT — SPHEQUIV_DERIVED
OS_SPHEQUIV: 1.25
OD_SPHEQUIV: -0.125
OS_SPHEQUIV: 2.5
OD_SPHEQUIV: 2

## 2024-02-05 ASSESSMENT — SUPERFICIAL PUNCTATE KERATITIS (SPK)
OS_SPK: 1+
OD_SPK: 2+

## 2024-02-05 ASSESSMENT — REFRACTION_AUTOREFRACTION
OD_SPHERE: -0.75
OS_SPHERE: -0.50
OD_CYLINDER: +1.25
OS_AXIS: 165
OS_CYLINDER: +3.50
OD_AXIS: 141

## 2025-01-20 ENCOUNTER — OFFICE (OUTPATIENT)
Facility: LOCATION | Age: 80
Setting detail: OPHTHALMOLOGY
End: 2025-01-20
Payer: MEDICARE

## 2025-01-20 DIAGNOSIS — H18.513: ICD-10-CM

## 2025-01-20 DIAGNOSIS — H21.81: ICD-10-CM

## 2025-01-20 DIAGNOSIS — Z96.1: ICD-10-CM

## 2025-01-20 DIAGNOSIS — H35.3131: ICD-10-CM

## 2025-01-20 DIAGNOSIS — H40.013: ICD-10-CM

## 2025-01-20 DIAGNOSIS — H16.223: ICD-10-CM

## 2025-01-20 DIAGNOSIS — E11.9: ICD-10-CM

## 2025-01-20 DIAGNOSIS — H00.14: ICD-10-CM

## 2025-01-20 PROCEDURE — 92014 COMPRE OPH EXAM EST PT 1/>: CPT | Performed by: OPHTHALMOLOGY

## 2025-01-20 ASSESSMENT — KERATOMETRY
OS_K2POWER_DIOPTERS: 44.25
METHOD_AUTO_MANUAL: AUTO
OD_K2POWER_DIOPTERS: 44.25
OS_AXISANGLE_DEGREES: 161
OD_AXISANGLE_DEGREES: 12
OS_K1POWER_DIOPTERS: 42.25
OD_K1POWER_DIOPTERS: 43.00

## 2025-01-20 ASSESSMENT — REFRACTION_MANIFEST
OD_CYLINDER: +0.50
OS_AXIS: 180
OD_SPHERE: +1.75
OS_CYLINDER: +0.50
OD_AXIS: 010
OS_SPHERE: +2.25

## 2025-01-20 ASSESSMENT — CORNEAL DYSTROPHY - POSTERIOR
OS_POSTERIORDYSTROPHY: 1+ GUTTATA
OD_POSTERIORDYSTROPHY: 1+ GUTTATA

## 2025-01-20 ASSESSMENT — REFRACTION_AUTOREFRACTION
OD_AXIS: 141
OD_CYLINDER: +1.25
OS_AXIS: 165
OD_SPHERE: -0.75
OS_SPHERE: -0.50
OS_CYLINDER: +3.50

## 2025-01-20 ASSESSMENT — VISUAL ACUITY
OS_BCVA: 20/30
OD_BCVA: 20/30

## 2025-01-20 ASSESSMENT — SUPERFICIAL PUNCTATE KERATITIS (SPK)
OD_SPK: 2+
OS_SPK: 1+

## 2025-01-20 ASSESSMENT — CONFRONTATIONAL VISUAL FIELD TEST (CVF)
OS_FINDINGS: FULL
OD_FINDINGS: FULL

## 2025-01-20 ASSESSMENT — CORNEAL EDEMA - FOLDS/STRIAE
OD_FOLDSSTRIAE: 1+
OS_FOLDSSTRIAE: 1+